# Patient Record
Sex: FEMALE | Race: WHITE | NOT HISPANIC OR LATINO | Employment: FULL TIME | URBAN - METROPOLITAN AREA
[De-identification: names, ages, dates, MRNs, and addresses within clinical notes are randomized per-mention and may not be internally consistent; named-entity substitution may affect disease eponyms.]

---

## 2021-09-09 ENCOUNTER — APPOINTMENT (OUTPATIENT)
Dept: URGENT CARE | Facility: CLINIC | Age: 36
End: 2021-09-09
Payer: COMMERCIAL

## 2022-06-15 ENCOUNTER — HOSPITAL ENCOUNTER (OUTPATIENT)
Facility: MEDICAL CENTER | Age: 37
End: 2022-06-15
Attending: NURSE PRACTITIONER
Payer: COMMERCIAL

## 2022-06-15 ENCOUNTER — OFFICE VISIT (OUTPATIENT)
Dept: URGENT CARE | Facility: CLINIC | Age: 37
End: 2022-06-15
Payer: COMMERCIAL

## 2022-06-15 VITALS
HEIGHT: 66 IN | WEIGHT: 293 LBS | DIASTOLIC BLOOD PRESSURE: 84 MMHG | OXYGEN SATURATION: 96 % | HEART RATE: 114 BPM | BODY MASS INDEX: 47.09 KG/M2 | TEMPERATURE: 96.8 F | RESPIRATION RATE: 18 BRPM | SYSTOLIC BLOOD PRESSURE: 120 MMHG

## 2022-06-15 DIAGNOSIS — R10.84 GENERALIZED ABDOMINAL PAIN: ICD-10-CM

## 2022-06-15 DIAGNOSIS — K21.9 GASTROESOPHAGEAL REFLUX DISEASE WITHOUT ESOPHAGITIS: ICD-10-CM

## 2022-06-15 PROCEDURE — 99204 OFFICE O/P NEW MOD 45 MIN: CPT | Performed by: NURSE PRACTITIONER

## 2022-06-15 PROCEDURE — 87086 URINE CULTURE/COLONY COUNT: CPT

## 2022-06-15 RX ORDER — FAMOTIDINE 20 MG/1
20 TABLET, FILM COATED ORAL 2 TIMES DAILY
Qty: 60 TABLET | Refills: 0 | Status: SHIPPED | OUTPATIENT
Start: 2022-06-15

## 2022-06-15 ASSESSMENT — ENCOUNTER SYMPTOMS
FEVER: 0
HEMATOCHEZIA: 0
SORE THROAT: 0
BLOOD IN STOOL: 0
ABDOMINAL PAIN: 1
VOMITING: 0
HEADACHES: 0
DIZZINESS: 0
CONSTIPATION: 0
ANOREXIA: 0
MYALGIAS: 0
NAUSEA: 0
DIARRHEA: 0
CHILLS: 0
SHORTNESS OF BREATH: 0
HEARTBURN: 1
EYE PAIN: 0

## 2022-06-15 ASSESSMENT — CROHNS DISEASE ACTIVITY INDEX (CDAI): CDAI SCORE: 0

## 2022-06-15 NOTE — PROGRESS NOTES
Subjective:   Dea Moyer is a 37 y.o. female who presents for Abdominal Pain (Upper right under breast, bloating, pain with standing)      Abdominal Pain  This is a new problem. The current episode started more than 1 month ago (Upper GI discomfort with bloating pain radiates occasionally to the left upper quadrant). The onset quality is undetermined. The problem occurs constantly. The problem has been unchanged. The pain is located in the generalized abdominal region, epigastric region and LUQ. The pain is at a severity of 7/10. The pain is moderate. The quality of the pain is sharp. The abdominal pain radiates to the LUQ. Pertinent negatives include no anorexia, constipation, diarrhea, fever, headaches, hematochezia, hematuria, melena, myalgias, nausea or vomiting. The pain is aggravated by eating and movement. The pain is relieved by nothing. She has tried antacids and proton pump inhibitors for the symptoms. The treatment provided no relief. Her past medical history is significant for GERD. There is no history of abdominal surgery, Crohn's disease, gallstones, irritable bowel syndrome or pancreatitis.       Review of Systems   Constitutional: Negative for chills and fever.   HENT: Negative for sore throat.    Eyes: Negative for pain.   Respiratory: Negative for shortness of breath.    Cardiovascular: Negative for chest pain.   Gastrointestinal: Positive for abdominal pain and heartburn. Negative for anorexia, blood in stool, constipation, diarrhea, hematochezia, melena, nausea and vomiting.   Genitourinary: Negative for hematuria.   Musculoskeletal: Negative for myalgias.   Skin: Negative for rash.   Neurological: Negative for dizziness and headaches.       Medications:    • famotidine Tabs    Allergies: Patient has no known allergies.    Problem List: Dea Moyer does not have a problem list on file.    Surgical History:  No past surgical history on file.    Past Social Hx: Dea Moyer   "reports that she has never smoked. She has never used smokeless tobacco.     Past Family Hx:  Dea Moyer family history is not on file.     Problem list, medications, and allergies reviewed by myself today in Epic.     Objective:     /84   Pulse (!) 114   Temp 36 °C (96.8 °F) (Temporal)   Resp 18   Ht 1.676 m (5' 6\")   Wt (!) 137 kg (301 lb)   LMP 06/05/2022 (Within Days)   SpO2 96%   BMI 48.58 kg/m²     Physical Exam  Vitals and nursing note reviewed.   Constitutional:       General: She is not in acute distress.     Appearance: She is well-developed. She is morbidly obese.   HENT:      Head: Normocephalic and atraumatic.      Right Ear: External ear normal.      Left Ear: External ear normal.      Nose: Nose normal.      Mouth/Throat:      Mouth: Mucous membranes are moist.   Eyes:      Conjunctiva/sclera: Conjunctivae normal.   Cardiovascular:      Rate and Rhythm: Normal rate.   Pulmonary:      Effort: Pulmonary effort is normal. No respiratory distress.      Breath sounds: Normal breath sounds.   Abdominal:      General: Bowel sounds are normal. There is no distension.      Tenderness: There is generalized abdominal tenderness. There is no right CVA tenderness, left CVA tenderness, guarding or rebound. Negative signs include Hunter's sign, Rovsing's sign, McBurney's sign, psoas sign and obturator sign.      Hernia: No hernia is present.   Musculoskeletal:         General: Normal range of motion.   Skin:     General: Skin is warm and dry.   Neurological:      General: No focal deficit present.      Mental Status: She is alert and oriented to person, place, and time. Mental status is at baseline.      Gait: Gait (gait at baseline) normal.   Psychiatric:         Judgment: Judgment normal.         Assessment/Plan:     Diagnosis and associated orders:     1. Generalized abdominal pain  famotidine (PEPCID) 20 MG Tab    Referral to Gastroenterology    URINE CULTURE(NEW)   2. Gastroesophageal " reflux disease without esophagitis  famotidine (PEPCID) 20 MG Tab    Referral to Gastroenterology        Comments/MDM:     I personally reviewed prior external notes and prior test results pertinent to today's visit.  Patient having no acute abdominal tenderness, no rebound and no guarding, ongoing symptoms x2 months will trial Rx medication discussed dietary modifications, patient will follow up with GI for further evaluation management.  Discussed management options, risks and benefits, and alternatives to treatment plan agreed upon.   Red flags discussed and indications to immediately call 911 or present to the Emergency Department.   Supportive care, differential diagnoses, and indications for immediate follow-up discussed with patient.    • Patient expresses understanding and agrees to plan. Patient denies any other questions or concerns.   •              Please note that this dictation was created using voice recognition software. I have made a reasonable attempt to correct obvious errors, but I expect that there are errors of grammar and possibly content that I did not discover before finalizing the note.    This note was electronically signed by Abad WEBSTER.

## 2022-06-16 DIAGNOSIS — R10.84 GENERALIZED ABDOMINAL PAIN: ICD-10-CM

## 2022-06-18 LAB
BACTERIA UR CULT: NORMAL
SIGNIFICANT IND 70042: NORMAL
SITE SITE: NORMAL
SOURCE SOURCE: NORMAL

## 2022-06-20 ENCOUNTER — APPOINTMENT (OUTPATIENT)
Dept: RADIOLOGY | Facility: MEDICAL CENTER | Age: 37
DRG: 742 | End: 2022-06-20
Attending: EMERGENCY MEDICINE
Payer: COMMERCIAL

## 2022-06-20 ENCOUNTER — HOSPITAL ENCOUNTER (INPATIENT)
Facility: MEDICAL CENTER | Age: 37
LOS: 7 days | DRG: 742 | End: 2022-06-27
Attending: EMERGENCY MEDICINE | Admitting: SPECIALIST
Payer: COMMERCIAL

## 2022-06-20 DIAGNOSIS — R10.9 ABDOMINAL PAIN, UNSPECIFIED ABDOMINAL LOCATION: ICD-10-CM

## 2022-06-20 DIAGNOSIS — R19.00 PELVIC MASS: ICD-10-CM

## 2022-06-20 DIAGNOSIS — G89.18 POSTOPERATIVE PAIN: ICD-10-CM

## 2022-06-20 LAB
ALBUMIN SERPL BCP-MCNC: 3.8 G/DL (ref 3.2–4.9)
ALBUMIN/GLOB SERPL: 1 G/DL
ALP SERPL-CCNC: 59 U/L (ref 30–99)
ALT SERPL-CCNC: 11 U/L (ref 2–50)
ANION GAP SERPL CALC-SCNC: 13 MMOL/L (ref 7–16)
APPEARANCE UR: CLEAR
AST SERPL-CCNC: 21 U/L (ref 12–45)
BASOPHILS # BLD AUTO: 0.4 % (ref 0–1.8)
BASOPHILS # BLD: 0.03 K/UL (ref 0–0.12)
BILIRUB SERPL-MCNC: 0.4 MG/DL (ref 0.1–1.5)
BILIRUB UR QL STRIP.AUTO: NEGATIVE
BUN SERPL-MCNC: 8 MG/DL (ref 8–22)
CALCIUM SERPL-MCNC: 8.9 MG/DL (ref 8.5–10.5)
CANCER AG125 SERPL-ACNC: 434 U/ML (ref 0–35)
CEA SERPL-MCNC: 94.3 NG/ML (ref 0–3)
CHLORIDE SERPL-SCNC: 102 MMOL/L (ref 96–112)
CO2 SERPL-SCNC: 23 MMOL/L (ref 20–33)
COLOR UR: YELLOW
CREAT SERPL-MCNC: 0.8 MG/DL (ref 0.5–1.4)
EOSINOPHIL # BLD AUTO: 0.12 K/UL (ref 0–0.51)
EOSINOPHIL NFR BLD: 1.8 % (ref 0–6.9)
ERYTHROCYTE [DISTWIDTH] IN BLOOD BY AUTOMATED COUNT: 46.1 FL (ref 35.9–50)
GFR SERPLBLD CREATININE-BSD FMLA CKD-EPI: 98 ML/MIN/1.73 M 2
GLOBULIN SER CALC-MCNC: 3.8 G/DL (ref 1.9–3.5)
GLUCOSE SERPL-MCNC: 80 MG/DL (ref 65–99)
GLUCOSE UR STRIP.AUTO-MCNC: NEGATIVE MG/DL
HCG SERPL QL: NEGATIVE
HCT VFR BLD AUTO: 37.9 % (ref 37–47)
HGB BLD-MCNC: 12.2 G/DL (ref 12–16)
IMM GRANULOCYTES # BLD AUTO: 0.01 K/UL (ref 0–0.11)
IMM GRANULOCYTES NFR BLD AUTO: 0.1 % (ref 0–0.9)
KETONES UR STRIP.AUTO-MCNC: 40 MG/DL
LEUKOCYTE ESTERASE UR QL STRIP.AUTO: NEGATIVE
LIPASE SERPL-CCNC: 14 U/L (ref 11–82)
LYMPHOCYTES # BLD AUTO: 1.46 K/UL (ref 1–4.8)
LYMPHOCYTES NFR BLD: 21.7 % (ref 22–41)
MCH RBC QN AUTO: 27.7 PG (ref 27–33)
MCHC RBC AUTO-ENTMCNC: 32.2 G/DL (ref 33.6–35)
MCV RBC AUTO: 86.1 FL (ref 81.4–97.8)
MICRO URNS: ABNORMAL
MONOCYTES # BLD AUTO: 0.4 K/UL (ref 0–0.85)
MONOCYTES NFR BLD AUTO: 5.9 % (ref 0–13.4)
NEUTROPHILS # BLD AUTO: 4.72 K/UL (ref 2–7.15)
NEUTROPHILS NFR BLD: 70.1 % (ref 44–72)
NITRITE UR QL STRIP.AUTO: NEGATIVE
NRBC # BLD AUTO: 0 K/UL
NRBC BLD-RTO: 0 /100 WBC
PH UR STRIP.AUTO: 5.5 [PH] (ref 5–8)
PLATELET # BLD AUTO: 451 K/UL (ref 164–446)
PMV BLD AUTO: 8.7 FL (ref 9–12.9)
POTASSIUM SERPL-SCNC: 3.9 MMOL/L (ref 3.6–5.5)
PROT SERPL-MCNC: 7.6 G/DL (ref 6–8.2)
PROT UR QL STRIP: NEGATIVE MG/DL
RBC # BLD AUTO: 4.4 M/UL (ref 4.2–5.4)
RBC UR QL AUTO: NEGATIVE
SODIUM SERPL-SCNC: 138 MMOL/L (ref 135–145)
SP GR UR STRIP.AUTO: 1.02
UROBILINOGEN UR STRIP.AUTO-MCNC: 0.2 MG/DL
WBC # BLD AUTO: 6.7 K/UL (ref 4.8–10.8)

## 2022-06-20 PROCEDURE — 76856 US EXAM PELVIC COMPLETE: CPT

## 2022-06-20 PROCEDURE — 700111 HCHG RX REV CODE 636 W/ 250 OVERRIDE (IP): Performed by: EMERGENCY MEDICINE

## 2022-06-20 PROCEDURE — 700105 HCHG RX REV CODE 258: Performed by: SPECIALIST

## 2022-06-20 PROCEDURE — 36415 COLL VENOUS BLD VENIPUNCTURE: CPT

## 2022-06-20 PROCEDURE — 86304 IMMUNOASSAY TUMOR CA 125: CPT

## 2022-06-20 PROCEDURE — 99285 EMERGENCY DEPT VISIT HI MDM: CPT

## 2022-06-20 PROCEDURE — 84703 CHORIONIC GONADOTROPIN ASSAY: CPT

## 2022-06-20 PROCEDURE — A9270 NON-COVERED ITEM OR SERVICE: HCPCS | Performed by: SPECIALIST

## 2022-06-20 PROCEDURE — 96374 THER/PROPH/DIAG INJ IV PUSH: CPT

## 2022-06-20 PROCEDURE — 96376 TX/PRO/DX INJ SAME DRUG ADON: CPT

## 2022-06-20 PROCEDURE — 74176 CT ABD & PELVIS W/O CONTRAST: CPT

## 2022-06-20 PROCEDURE — 85025 COMPLETE CBC W/AUTO DIFF WBC: CPT

## 2022-06-20 PROCEDURE — 700102 HCHG RX REV CODE 250 W/ 637 OVERRIDE(OP): Performed by: SPECIALIST

## 2022-06-20 PROCEDURE — 83690 ASSAY OF LIPASE: CPT

## 2022-06-20 PROCEDURE — 770006 HCHG ROOM/CARE - MED/SURG/GYN SEMI*

## 2022-06-20 PROCEDURE — 96375 TX/PRO/DX INJ NEW DRUG ADDON: CPT

## 2022-06-20 PROCEDURE — 80053 COMPREHEN METABOLIC PANEL: CPT

## 2022-06-20 PROCEDURE — 82378 CARCINOEMBRYONIC ANTIGEN: CPT

## 2022-06-20 PROCEDURE — 81003 URINALYSIS AUTO W/O SCOPE: CPT

## 2022-06-20 RX ORDER — KETOROLAC TROMETHAMINE 30 MG/ML
15 INJECTION, SOLUTION INTRAMUSCULAR; INTRAVENOUS ONCE
Status: COMPLETED | OUTPATIENT
Start: 2022-06-20 | End: 2022-06-20

## 2022-06-20 RX ORDER — ONDANSETRON 2 MG/ML
4 INJECTION INTRAMUSCULAR; INTRAVENOUS EVERY 4 HOURS PRN
Status: DISCONTINUED | OUTPATIENT
Start: 2022-06-20 | End: 2022-06-23

## 2022-06-20 RX ORDER — POLYETHYLENE GLYCOL 3350 17 G/17G
1 POWDER, FOR SOLUTION ORAL
Status: DISCONTINUED | OUTPATIENT
Start: 2022-06-20 | End: 2022-06-22

## 2022-06-20 RX ORDER — IBUPROFEN 200 MG
600 TABLET ORAL EVERY 6 HOURS PRN
COMMUNITY

## 2022-06-20 RX ORDER — BISACODYL 10 MG
10 SUPPOSITORY, RECTAL RECTAL
Status: DISCONTINUED | OUTPATIENT
Start: 2022-06-20 | End: 2022-06-22

## 2022-06-20 RX ORDER — ONDANSETRON 2 MG/ML
4 INJECTION INTRAMUSCULAR; INTRAVENOUS ONCE
Status: COMPLETED | OUTPATIENT
Start: 2022-06-20 | End: 2022-06-20

## 2022-06-20 RX ORDER — HYDROCODONE BITARTRATE AND ACETAMINOPHEN 5; 325 MG/1; MG/1
1 TABLET ORAL
Status: DISCONTINUED | OUTPATIENT
Start: 2022-06-20 | End: 2022-06-21

## 2022-06-20 RX ORDER — HYDROCODONE BITARTRATE AND ACETAMINOPHEN 5; 325 MG/1; MG/1
1 TABLET ORAL EVERY 6 HOURS PRN
Qty: 12 TABLET | Refills: 0 | Status: SHIPPED | OUTPATIENT
Start: 2022-06-20 | End: 2022-06-23

## 2022-06-20 RX ORDER — IBUPROFEN 600 MG/1
600 TABLET ORAL 3 TIMES DAILY PRN
Status: COMPLETED | OUTPATIENT
Start: 2022-06-20 | End: 2022-06-21

## 2022-06-20 RX ORDER — MORPHINE SULFATE 4 MG/ML
4 INJECTION INTRAVENOUS ONCE
Status: COMPLETED | OUTPATIENT
Start: 2022-06-20 | End: 2022-06-20

## 2022-06-20 RX ORDER — SODIUM CHLORIDE, SODIUM LACTATE, POTASSIUM CHLORIDE, CALCIUM CHLORIDE 600; 310; 30; 20 MG/100ML; MG/100ML; MG/100ML; MG/100ML
INJECTION, SOLUTION INTRAVENOUS CONTINUOUS
Status: DISCONTINUED | OUTPATIENT
Start: 2022-06-20 | End: 2022-06-27 | Stop reason: HOSPADM

## 2022-06-20 RX ORDER — AMOXICILLIN 250 MG
2 CAPSULE ORAL 2 TIMES DAILY
Status: DISCONTINUED | OUTPATIENT
Start: 2022-06-20 | End: 2022-06-22

## 2022-06-20 RX ORDER — MORPHINE SULFATE 4 MG/ML
4 INJECTION INTRAVENOUS EVERY 4 HOURS PRN
Status: COMPLETED | OUTPATIENT
Start: 2022-06-20 | End: 2022-06-21

## 2022-06-20 RX ORDER — ONDANSETRON 2 MG/ML
4 INJECTION INTRAMUSCULAR; INTRAVENOUS EVERY 6 HOURS PRN
Status: DISCONTINUED | OUTPATIENT
Start: 2022-06-20 | End: 2022-06-20

## 2022-06-20 RX ORDER — OXYCODONE HYDROCHLORIDE AND ACETAMINOPHEN 5; 325 MG/1; MG/1
1 TABLET ORAL EVERY 4 HOURS PRN
Status: DISCONTINUED | OUTPATIENT
Start: 2022-06-20 | End: 2022-06-23

## 2022-06-20 RX ADMIN — MORPHINE SULFATE 4 MG: 4 INJECTION INTRAVENOUS at 21:16

## 2022-06-20 RX ADMIN — SODIUM CHLORIDE, POTASSIUM CHLORIDE, SODIUM LACTATE AND CALCIUM CHLORIDE: 600; 310; 30; 20 INJECTION, SOLUTION INTRAVENOUS at 21:16

## 2022-06-20 RX ADMIN — MORPHINE SULFATE 4 MG: 4 INJECTION INTRAVENOUS at 19:06

## 2022-06-20 RX ADMIN — ONDANSETRON HYDROCHLORIDE 4 MG: 2 SOLUTION INTRAMUSCULAR; INTRAVENOUS at 19:07

## 2022-06-20 RX ADMIN — KETOROLAC TROMETHAMINE 15 MG: 30 INJECTION, SOLUTION INTRAMUSCULAR; INTRAVENOUS at 14:17

## 2022-06-20 RX ADMIN — OXYCODONE HYDROCHLORIDE AND ACETAMINOPHEN 1 TABLET: 5; 325 TABLET ORAL at 23:33

## 2022-06-20 ASSESSMENT — LIFESTYLE VARIABLES: DO YOU DRINK ALCOHOL: NO

## 2022-06-20 NOTE — ED NOTES
Pt informed that urine specimen needs to be collected, instructed to call staff once ready to use the restroom.

## 2022-06-20 NOTE — DISCHARGE INSTRUCTIONS
Discharge Instructions    Discharged to home by car with relative. Discharged via wheelchair, hospital escort: Yes.  Special equipment needed: Not Applicable    Be sure to schedule a follow-up appointment with your primary care doctor or any specialists as instructed.     Discharge Plan:        I understand that a diet low in cholesterol, fat, and sodium is recommended for good health. Unless I have been given specific instructions below for another diet, I accept this instruction as my diet prescription.   Other diet:     Special Instructions: None    Is patient discharged on Warfarin / Coumadin?   No     Depression / Suicide Risk    As you are discharged from this [facility] facility, it is important to learn how to keep safe from harming yourself.    Recognize the warning signs:  Abrupt changes in personality, positive or negative- including increase in energy   Giving away possessions  Change in eating patterns- significant weight changes-  positive or negative  Change in sleeping patterns- unable to sleep or sleeping all the time   Unwillingness or inability to communicate  Depression  Unusual sadness, discouragement and loneliness  Talk of wanting to die  Neglect of personal appearance   Rebelliousness- reckless behavior  Withdrawal from people/activities they love  Confusion- inability to concentrate     If you or a loved one observes any of these behaviors or has concerns about self-harm, here's what you can do:  Talk about it- your feelings and reasons for harming yourself  Remove any means that you might use to hurt yourself (examples: pills, rope, extension cords, firearm)  Get professional help from the community (Mental Health, Substance Abuse, psychological counseling)  Do not be alone:Call your Safe Contact- someone whom you trust who will be there for you.  Call your local CRISIS HOTLINE 403-0342 or 408-296-3387  Call your local Children's Mobile Crisis Response Team [place] (727) 253-0372  or www.[employer]  Call the toll free National Suicide Prevention Hotlines   National Suicide Prevention Lifeline 338-893-VCSP (2759)  [facility] Line Network 800-SUICIDE (501-6724)      1. No heavy lifting greater than 10 pounds for minimum 6 weeks  2. Nothing in vagina (ie no tampons, douching, intercourse) for minimum of 6 weeks  3. No driving while taking narcotics   4. Return to our office as directed and call to confirm appointment  Call our office [phone] if you develop any fevers, chills, nausea/vomiting, heavy vaginal bleeding, or redness, tenderness, and/or drainage from your wound, if you have persistent watery discharge while ambulating or stool draining from the vagina .  5. Showering is ok after shower make sure wound is dry. Ok to leave midline incision open to air.   6. If you have not had a bowel movement for 2 days, please take over the counter Milk of Magnesium, 1 tablespoon every 4 hours. After 4 doses and if you still have not had a bowel movement, please call your doctor.   7. You may resume your regular diet.

## 2022-06-20 NOTE — ED TRIAGE NOTES
"Chief Complaint   Patient presents with   • Abdominal Pain     Pt reports that for the last 2 months she has had worsening, intermittent, bilateral epigastric pain that is worse when she moves. She has also noticed abdominal swelling that is abnormal for her. Pt reports that she went to urgent care on Wednesday and was told that she might have a hiatal hernia, but no imaging was done. Pt was referred to a GI doctor, and had an appointment today.      BP (!) 168/109   Pulse (!) 112   Temp 36.2 °C (97.1 °F) (Temporal)   Resp 16   Ht 1.676 m (5' 6\")   Wt (!) 135 kg (298 lb 1 oz)   LMP 06/17/2022 (Within Days)   SpO2 97%   BMI 48.11 kg/m²     Pt is ambulatory in and out of triage. Appropriate PPE worn throughout entire encounter. Pt placed back in the lobby and educated about triage process.    "

## 2022-06-20 NOTE — ED PROVIDER NOTES
ED Provider Note    ER PROVIDER NOTE      CHIEF COMPLAINT  Chief Complaint   Patient presents with   • Abdominal Pain     Pt reports that for the last 2 months she has had worsening, intermittent, bilateral epigastric pain that is worse when she moves. She has also noticed abdominal swelling that is abnormal for her. Pt reports that she went to urgent care on Wednesday and was told that she might have a hiatal hernia, but no imaging was done. Pt was referred to a GI doctor, and had an appointment today.        HPI  Dea Moyer is a 37 y.o. female who presents to the emergency department complaining of abdominal pain.  Patient reports she has had gradually worsening pain over the last 2 months, seems to be more upper, and left-sided although somewhat central she feels her abdomen is somewhat distended as well.  The pain is more of a pressure and swelling sensation.  Does not seem to have any alleviating or aggravating factors, may be better when she is lying down.  No real changes with eating.  She reports no nausea or vomiting.  No diarrhea.  No fevers or chills, no chest pain, cough, congestion or shortness of breath.  No dysuria.  States she is not pregnant.    REVIEW OF SYSTEMS  Pertinent positives include abdominal pain. Pertinent negatives include no fever. See HPI for details. All other systems reviewed and are negative.    PAST MEDICAL HISTORY       SURGICAL HISTORY  patient denies any surgical history    FAMILY HISTORY  History reviewed. No pertinent family history.    SOCIAL HISTORY  Social History     Socioeconomic History   • Marital status: Single   Tobacco Use   • Smoking status: Never Smoker   • Smokeless tobacco: Never Used   Vaping Use   • Vaping Use: Never used   Substance and Sexual Activity   • Alcohol use: Yes     Comment: occasionally   • Drug use: Not Currently      Social History     Substance and Sexual Activity   Drug Use Not Currently       CURRENT MEDICATIONS  Home Medications      "Reviewed by Cindy Houston PhT (Pharmacy Tech) on 06/20/22 at 1452  Med List Status: Complete   Medication Last Dose Status   famotidine (PEPCID) 20 MG Tab NOT YET STARTED Active   ibuprofen (MOTRIN) 200 MG Tab 6/20/2022 Active                ALLERGIES  No Known Allergies    PHYSICAL EXAM  VITAL SIGNS: BP (!) 156/95   Pulse (!) 103   Temp 36.2 °C (97.1 °F) (Temporal)   Resp 16   Ht 1.676 m (5' 6\")   Wt (!) 135 kg (298 lb 1 oz)   LMP 06/17/2022 (Within Days)   SpO2 98%   BMI 48.11 kg/m²   Pulse ox interpretation: I interpret this pulse ox as normal.    Constitutional: Alert in no apparent distress.  HENT: No signs of trauma, Bilateral external ears normal, Nose normal.   Eyes: Pupils are equal and reactive, Conjunctiva normal, Non-icteric.   Neck: Normal range of motion, No tenderness, Supple, No stridor.   Lymphatic: No lymphadenopathy noted.   Cardiovascular: Regular rate and rhythm, no murmurs.   Thorax & Lungs: Normal breath sounds, No respiratory distress, No wheezing, No chest tenderness.   Abdomen: Bowel sounds normal, moderate diffuse tenderness,  No pulsatile masses. No peritoneal signs.  Skin: Warm, Dry, No erythema, No rash.   Back: No bony tenderness, No CVA tenderness.   Extremities: Intact distal pulses, No edema, No tenderness, No cyanosis,   Musculoskeletal: Good range of motion in all major joints. No tenderness to palpation or major deformities noted.   Neurologic: Alert , Normal motor function, Normal sensory function, No focal deficits noted.   Psychiatric: Affect normal, Judgment normal, Mood normal.     DIAGNOSTIC STUDIES / PROCEDURES        LABS  Labs Reviewed   URINALYSIS - Abnormal; Notable for the following components:       Result Value    Ketones 40 (*)     All other components within normal limits   COMP METABOLIC PANEL - Abnormal; Notable for the following components:    Globulin 3.8 (*)     All other components within normal limits   CBC WITH DIFFERENTIAL - Abnormal; Notable for " the following components:    MCHC 32.2 (*)     Platelet Count 451 (*)     MPV 8.7 (*)     Lymphocytes 21.70 (*)     All other components within normal limits    Narrative:     SPECIMEN IS A RECOLLECT   CEA - Abnormal; Notable for the following components:    Carcinoembryonic Antigen 94.3 (*)     All other components within normal limits   CANCER ANTIGEN 125 - Abnormal; Notable for the following components:    Ca 125 434.0 (*)     All other components within normal limits   LIPASE   HCG QUAL SERUM   ESTIMATED GFR       All labs reviewed by me.    RADIOLOGY  US-PELVIC COMPLETE (TRANSABDOMINAL/TRANSVAGINAL) (COMBO)   Final Result      1.  Large, heterogenous, likely right adnexal mass measuring approximately 15.6 x 22.3 x 15.6 cm.   2.  The right adnexal teratoma seen on CT is not definitively seen on this exam.   3.  Moderate pelvic free fluid.   4.  Bilateral ovaries not visualized.   5.  Uterus is poorly visualized due to patient body habitus and large pelvic mass.      CT-RENAL COLIC EVALUATION(A/P W/O)   Final Result   Addendum 1 of 1   Small volume pelvic free fluid.      Final      1.  Large peritoneal mass measuring 31.8 x 21.5 cm that appears to arise from the pelvis. There is internal fat density. Appearance favors a large teratoma. Liposarcoma is in the differential.   2.  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   3.  Small perihepatic ascites.        The radiologist's interpretation of all radiological studies have been reviewed and images independently viewed by me.    COURSE & MEDICAL DECISION MAKING  Nursing notes, VS, PMSFHx reviewed in chart.    9:16 AM Patient seen and examined at bedside. Patient will be treated with toradol. Ordered for CBC, CMP, lipase, CT to evaluate her symptoms.     11:40 AM  Patient is reevaluated and updated on results of CT, plan for ultrasound    Patient's ultrasound has returned.  I discussed the findings with her.    Case is discussed with Dr. Gaspar, from gyn./onc    After  he is reviewed the case, he will plan to admit the patient for surgery on Thursday      Decision Making:  This is a 37 y.o. female presenting with progressive abdominal pain and some distention over the last 2 months.  Her initial CT showed large mass which seem to be pelvic in origin so ultrasound was obtained for further characterization.  She does not have a large pelvic mass and as such, Dr. Gaspar was consulted as above.  He will admit the patient for further care and surgical intervention    Patient is admitted in stable condition    FINAL IMPRESSION  1. Abdominal pain, unspecified abdominal location    2. Pelvic mass         The note accurately reflects work and decisions made by me.  Glenn Linares M.D.  6/20/2022  3:37 PM

## 2022-06-21 LAB — ABO + RH BLD: NORMAL

## 2022-06-21 PROCEDURE — 96376 TX/PRO/DX INJ SAME DRUG ADON: CPT

## 2022-06-21 PROCEDURE — A9270 NON-COVERED ITEM OR SERVICE: HCPCS | Performed by: SPECIALIST

## 2022-06-21 PROCEDURE — 770001 HCHG ROOM/CARE - MED/SURG/GYN PRIV*

## 2022-06-21 PROCEDURE — 700102 HCHG RX REV CODE 250 W/ 637 OVERRIDE(OP): Performed by: SPECIALIST

## 2022-06-21 PROCEDURE — 700102 HCHG RX REV CODE 250 W/ 637 OVERRIDE(OP): Performed by: EMERGENCY MEDICINE

## 2022-06-21 PROCEDURE — 700111 HCHG RX REV CODE 636 W/ 250 OVERRIDE (IP): Performed by: NURSE PRACTITIONER

## 2022-06-21 PROCEDURE — A9270 NON-COVERED ITEM OR SERVICE: HCPCS | Performed by: EMERGENCY MEDICINE

## 2022-06-21 PROCEDURE — 700105 HCHG RX REV CODE 258: Performed by: SPECIALIST

## 2022-06-21 PROCEDURE — 700111 HCHG RX REV CODE 636 W/ 250 OVERRIDE (IP): Performed by: EMERGENCY MEDICINE

## 2022-06-21 RX ORDER — MORPHINE SULFATE 4 MG/ML
4 INJECTION INTRAVENOUS EVERY 4 HOURS PRN
Status: DISCONTINUED | OUTPATIENT
Start: 2022-06-21 | End: 2022-06-22

## 2022-06-21 RX ADMIN — MORPHINE SULFATE 4 MG: 4 INJECTION INTRAVENOUS at 01:36

## 2022-06-21 RX ADMIN — MORPHINE SULFATE 4 MG: 4 INJECTION INTRAVENOUS at 23:08

## 2022-06-21 RX ADMIN — MORPHINE SULFATE 4 MG: 4 INJECTION INTRAVENOUS at 18:09

## 2022-06-21 RX ADMIN — OXYCODONE HYDROCHLORIDE AND ACETAMINOPHEN 1 TABLET: 5; 325 TABLET ORAL at 20:56

## 2022-06-21 RX ADMIN — MORPHINE SULFATE 4 MG: 4 INJECTION INTRAVENOUS at 12:14

## 2022-06-21 RX ADMIN — SENNOSIDES AND DOCUSATE SODIUM 2 TABLET: 50; 8.6 TABLET ORAL at 16:18

## 2022-06-21 RX ADMIN — HYDROCODONE BITARTRATE AND ACETAMINOPHEN 1 TABLET: 5; 325 TABLET ORAL at 11:07

## 2022-06-21 RX ADMIN — OXYCODONE HYDROCHLORIDE AND ACETAMINOPHEN 1 TABLET: 5; 325 TABLET ORAL at 09:46

## 2022-06-21 RX ADMIN — IBUPROFEN 600 MG: 600 TABLET, FILM COATED ORAL at 11:08

## 2022-06-21 RX ADMIN — SODIUM CHLORIDE, POTASSIUM CHLORIDE, SODIUM LACTATE AND CALCIUM CHLORIDE: 600; 310; 30; 20 INJECTION, SOLUTION INTRAVENOUS at 11:00

## 2022-06-21 RX ADMIN — MORPHINE SULFATE 4 MG: 4 INJECTION INTRAVENOUS at 07:35

## 2022-06-21 RX ADMIN — OXYCODONE HYDROCHLORIDE AND ACETAMINOPHEN 1 TABLET: 5; 325 TABLET ORAL at 15:10

## 2022-06-21 ASSESSMENT — COGNITIVE AND FUNCTIONAL STATUS - GENERAL
DAILY ACTIVITIY SCORE: 24
MOBILITY SCORE: 24
SUGGESTED CMS G CODE MODIFIER DAILY ACTIVITY: CH
SUGGESTED CMS G CODE MODIFIER MOBILITY: CH

## 2022-06-21 ASSESSMENT — LIFESTYLE VARIABLES
HOW MANY TIMES IN THE PAST YEAR HAVE YOU HAD 5 OR MORE DRINKS IN A DAY: 0
AVERAGE NUMBER OF DAYS PER WEEK YOU HAVE A DRINK CONTAINING ALCOHOL: 2
TOTAL SCORE: 0
HAVE YOU EVER FELT YOU SHOULD CUT DOWN ON YOUR DRINKING: NO
HAVE PEOPLE ANNOYED YOU BY CRITICIZING YOUR DRINKING: NO
TOTAL SCORE: 0
ON A TYPICAL DAY WHEN YOU DRINK ALCOHOL HOW MANY DRINKS DO YOU HAVE: 2
ALCOHOL_USE: YES
DOES PATIENT WANT TO STOP DRINKING: NO
EVER HAD A DRINK FIRST THING IN THE MORNING TO STEADY YOUR NERVES TO GET RID OF A HANGOVER: NO
EVER FELT BAD OR GUILTY ABOUT YOUR DRINKING: NO
CONSUMPTION TOTAL: NEGATIVE
TOTAL SCORE: 0

## 2022-06-21 ASSESSMENT — PAIN DESCRIPTION - PAIN TYPE
TYPE: ACUTE PAIN

## 2022-06-21 ASSESSMENT — ENCOUNTER SYMPTOMS
NAUSEA: 0
DIZZINESS: 0
COUGH: 0
SHORTNESS OF BREATH: 0
FEVER: 0
ABDOMINAL PAIN: 1
VOMITING: 0
CHILLS: 0

## 2022-06-21 ASSESSMENT — PATIENT HEALTH QUESTIONNAIRE - PHQ9
2. FEELING DOWN, DEPRESSED, IRRITABLE, OR HOPELESS: NOT AT ALL
1. LITTLE INTEREST OR PLEASURE IN DOING THINGS: NOT AT ALL
SUM OF ALL RESPONSES TO PHQ9 QUESTIONS 1 AND 2: 0

## 2022-06-21 NOTE — DIETARY
NUTRITION SERVICES: BMI - Pt with BMI >40 (=Body mass index is 48.11 kg/m².), Class III obesity. Weight loss counseling not appropriate in acute care setting.     RECOMMEND - If appropriate at DC please refer to outpatient nutrition services for weight management.

## 2022-06-21 NOTE — CARE PLAN
Problem: Pain - Standard  Goal: Alleviation of pain or a reduction in pain to the patient’s comfort goal  Outcome: Progressing  Note: Pt states pain is managed on current pain regimen of percocet and morphine.      Problem: Communication  Goal: The ability to communicate needs accurately and effectively will improve  Outcome: Progressing  Flowsheets (Taken 6/21/2022 9645)  Communication:   Oriented family/support system to call light   Provided augmentative/alternative communication device   Assessed patient's ability to understand and communicate   Oriented patient to call light   Reoriented patient to environment  Note: Plan of care discussed with patient. All questions answered. Plan for today pain control, orient to floor, obtain stool specimen.      The patient is Stable - Low risk of patient condition declining or worsening    Shift Goals  Clinical Goals: pain management; poc  Patient Goals: pain control;sleep    Progress made toward(s) clinical / shift goals:       Patient is not progressing towards the following goals:

## 2022-06-21 NOTE — PROGRESS NOTES
GYN/Oncology Progress Note               Author: HERMINIA Molina Date & Time created: 6/21/2022  8:27 AM     Interval History:  Patient doing well, pain well controlled with MS and percocet. Denies any nausea at this time.     Review of Systems:  Review of Systems   Constitutional: Negative for chills and fever.   Respiratory: Negative for cough and shortness of breath.    Cardiovascular: Negative for chest pain and leg swelling.   Gastrointestinal: Positive for abdominal pain (controlled). Negative for nausea and vomiting.   Genitourinary: Negative for dysuria and urgency.   Neurological: Negative for dizziness.       Physical Exam:  Physical Exam  Constitutional:       Appearance: Normal appearance.   Cardiovascular:      Pulses: Normal pulses.   Pulmonary:      Effort: Pulmonary effort is normal.   Abdominal:      General: There is distension.      Palpations: There is mass.      Tenderness: There is abdominal tenderness.   Skin:     General: Skin is warm and dry.      Capillary Refill: Capillary refill takes 2 to 3 seconds.   Neurological:      Mental Status: She is alert and oriented to person, place, and time.         Labs:          Recent Labs     06/20/22  0940   SODIUM 138   POTASSIUM 3.9   CHLORIDE 102   CO2 23   BUN 8   CREATININE 0.80   CALCIUM 8.9     Recent Labs     06/20/22  0940   ALTSGPT 11   ASTSGOT 21   ALKPHOSPHAT 59   TBILIRUBIN 0.4   LIPASE 14   GLUCOSE 80     Recent Labs     06/20/22  1147   RBC 4.40   HEMOGLOBIN 12.2   HEMATOCRIT 37.9   PLATELETCT 451*     Recent Labs     06/20/22  0940 06/20/22  1147   WBC  --  6.7   NEUTSPOLYS  --  70.10   LYMPHOCYTES  --  21.70*   MONOCYTES  --  5.90   EOSINOPHILS  --  1.80   BASOPHILS  --  0.40   ASTSGOT 21  --    ALTSGPT 11  --    ALKPHOSPHAT 59  --    TBILIRUBIN 0.4  --      Recent Labs     06/20/22  0940   SODIUM 138   POTASSIUM 3.9   CHLORIDE 102   CO2 23   GLUCOSE 80   BUN 8   CREATININE 0.80   CALCIUM 8.9     Hemodynamics:  Temp (24hrs),  Av.5 °C (97.7 °F), Min:36.2 °C (97.1 °F), Max:37.1 °C (98.8 °F)  Temperature: 37.1 °C (98.8 °F)  Pulse  Av.6  Min: 81  Max: 112   Blood Pressure: 136/82     Respiratory:    Respiration: 16, Pulse Oximetry: 92 %           Fluids:  No intake or output data in the 24 hours ending 2227  Weight: (!) 135 kg (298 lb 1 oz)  GI/Nutrition:  Orders Placed This Encounter   Procedures   • Diet Order Diet: Regular; Tray Modifications (optional): No Sharps/Paperware     Standing Status:   Standing     Number of Occurrences:   1     Order Specific Question:   Diet:     Answer:   Regular [1]     Order Specific Question:   Tray Modifications (optional)     Answer:   No Sharps/Paperware     Medical Decision Making, by Problem:  Active Hospital Problems    Diagnosis    • *Pelvic mass [R19.00]    • Abdominal pain [R10.9]        Plan:  37-year-old female presents with 30 cm mass with elevated  of 134 with a CEA of 94.3.      1. Pelvic mass: This is quite concerning for possible mucinous cystadenocarcinoma although a GI primary cannot be ruled out.  Should be noted however that patient has no GI symptoms to suspect that this may be a Krukenberg tumor.  We will go ahead and obtain a guaiac of the stool if it is positive patient should undergo a colonoscopy. If negative plan to proceed with surgery on Thursday with Dr. Gaspar.   2. Abdominal pain: morphine and percocet PRN    Case discussed with Dr. Gaspar.     Quality-Core Measures

## 2022-06-21 NOTE — PROGRESS NOTES
• Received report from ed RN. Assumed care at 1045 marquez bed.    • Patient a & o x 4.   • Pt denies numbness/tingling  • Strength RUE 5/5; LUE 5/5; RLE 5/5, LLE 5/5  • Voiding w/o difficulty  • BM on 6/20. Abd soft, Bowel sounds active, denies nausea, abd tender  • Diet regular  • Pt states pain 5/10   • no skin   • Patient ambulated 50 ft with sb assistance. Gait stedy.   • Patient oriented to room, surroundings and call bell. Bed alarm off. Bed in lowest position, locked and upper side rails up. Patient demonstrated correct use of call bell. Call bell/belongings within reach. Patient educated on hourly rounding.   • Plan   o Obtain stool specimen; if occult +; notify Dr Gaspar; pt will need colonoscopy prior to surgery.   o Pain control  o Ambulation  o Tentative surgery this Thursday.

## 2022-06-21 NOTE — PROGRESS NOTES
4 Eyes Skin Assessment Completed by RAMÍREZ Yanez and RAMÍREZ King.    Head WDL  Ears WDL  Nose WDL  Mouth WDL  Neck WDL  Breast/Chest WDL  Shoulder Blades WDL  Spine WDL  (R) Arm/Elbow/Hand Bruising  (L) Arm/Elbow/Hand Bruising  Abdomen WDL  Groin WDL  Scrotum/Coccyx/Buttocks WDL  (R) Leg WDL  (L) Leg WDL  (R) Heel/Foot/Toe WDL  (L) Heel/Foot/Toe WDL          Devices In Places Pulse Ox      Interventions In Place Gray Ear Foams, NC W/Ear Foams, Pillows, Heels Loaded W/Pillows and Pressure Redistribution Mattress    Possible Skin Injury No    Pictures Uploaded Into Epic N/A  Wound Consult Placed N/A  RN Wound Prevention Protocol Ordered No

## 2022-06-21 NOTE — PROGRESS NOTES
Bedside report received and patient care assumed. Pt is resting in bed, A&Ox4, with  complaints of abd pain 7/10, and is on RA.  All fall precautions are in place, belongings at bedside table.  Pt was updated on POC, no questions or concerns. Pt educated on use of call light for assistance.

## 2022-06-21 NOTE — H&P
"Chief complaint: Abdominal pain with 30 cm abdominal mass  History of present illness: Mrs. Moyer is a G0 white female who has a BMI of 48.  She presented to the emergency room with 2-day history of increased abdominal pain.  Patient underwent evaluation in the emergency room and was noted to have a 30 cm abdominal pelvic mass.   was elevated at 434 and CEA of 94.3.  Because of the complex nature of the mass and elevated  and CEA this was suspicious for possible GYN malignancy.  I was asked to see the patient.    Patient was seen by me for consultation and yellow 63.  Her mother was present.  Her history is as stated above.  Patient denies any change in bowel habits.  She states that she has had normal cycles up until about 2 months ago.  Over the past month she has had heavy bleeding.  She does not desire future fertility.  She denies any blood per rectum.  She denies any stool caliber changes.  She denies any bloatedness.  She has not had a GYN exam for many years.  She otherwise has no other symptoms.    Review of system: 14 point review of system is unremarkable with the exception was been described with HPI.    Allergies: No known drug allergies    Medications: Currently not on any medication    History reviewed. No pertinent past medical history.      History reviewed. No pertinent surgical history.    Social History: She drinks alcohol occasionally but denies any drug use or smoking.    Family history: Significant for lung cancer but no history of ovarian breast uterine or colon cancer.    BP (!) 141/85   Pulse 89   Temp 36.2 °C (97.1 °F) (Temporal)   Resp 16   Ht 1.676 m (5' 6\")   Wt (!) 135 kg (298 lb 1 oz)   LMP 06/17/2022 (Within Days)   SpO2 92%   BMI 48.11 kg/m²       General: Well-developed well-nourished female with a BMI of 48.    GENERAL - Alert and oriented individual, no apparent distress.    HEENT - Within normal limits, no sclera icterus, no pallor of conjunctiva, no " "supraclavicular or cervical lymphadenopathy detected.    LUNGS - Clear to auscultation bilaterally, no rales, rhonci, or wheezes.    CARDIAC - regular rate and rhythm, no murmur, clicks, or gallops.    ABDOMEN -Obese,  soft, non-tender, non-distended,  palpable mobile mass above the umbilicus, no hepatosplenomegaly, no inguinal lymphadenopathy.    PELVIS -deferred    EXTREMITIES - Non-erythematous, non-tender.    NEURO - grossly intact.    CT scan of the abdomen pelvis reviewed no evidence of ascites no evidence of retroperitoneal adenopathy no evidence of rheumatic caking there is some right perihepatic fluid that is noted.  Enlarged complex abdominal pelvic masses noted.    Laboratory test reviewed    Impression: 37-year-old female presents with 30 cm mass with elevated  of 134 with a CEA of 94.3.  This is quite concerning for possible mucinous cystadenocarcinoma although a GI primary cannot be ruled out.  Should be noted however that patient has no GI symptoms to suspect that this may be a Krukenberg tumor.  We will go ahead and obtain a guaiac of the stool if it is positive patient should undergo a colonoscopy.    My recommendation is this mass needs to be excised for surgical pathological evaluation.  I extensively discussed with the patient of my concerns.  I would recommend an exploratory laparotomy with an excision of this pelvic mass and based on the pathology report she may require hysterectomy with removal of both ovaries with possible surgical staging.    Options of treatments were discussed and reviewed. The risks, benefits, and rationale of the procedures were reviewed with the patient. The risks as stated on the [facility] consent forms were reviewed with the patient. The risks including but not limited to infection, bleeding, possible blood transfusion, possible injury to the bowel, bladder, ureter, fistula formations, bladder atony, sexual dysfunction, hernia formation, reoperation, " blood clot, pulmonary embolism, death were all reviewed with the patient, patient is understanding all these risks and wish to proceed with surgery as planned.      Recommend: 1.  Admit to oncology in preparation for surgery which is tentatively scheduled for this coming Thursday on June 23, 2022.  2.  Guaiac of the stool to determine if she has blood in her stool if her guaiac is positive, GI consultation will be requested for colonoscopy to rule out any GI malignancy.  3.  Standard preoperative blood work should be done in preparation for surgery and bowel prep on Wednesday in preparation for surgery Thursday.

## 2022-06-22 LAB
ABO GROUP BLD: NORMAL
ANION GAP SERPL CALC-SCNC: 9 MMOL/L (ref 7–16)
APTT PPP: 34.3 SEC (ref 24.7–36)
BASOPHILS # BLD AUTO: 0.4 % (ref 0–1.8)
BASOPHILS # BLD: 0.02 K/UL (ref 0–0.12)
BLD GP AB SCN SERPL QL: NORMAL
BUN SERPL-MCNC: 12 MG/DL (ref 8–22)
CALCIUM SERPL-MCNC: 8.7 MG/DL (ref 8.5–10.5)
CHLORIDE SERPL-SCNC: 105 MMOL/L (ref 96–112)
CO2 SERPL-SCNC: 24 MMOL/L (ref 20–33)
CREAT SERPL-MCNC: 0.79 MG/DL (ref 0.5–1.4)
EOSINOPHIL # BLD AUTO: 0.23 K/UL (ref 0–0.51)
EOSINOPHIL NFR BLD: 4.3 % (ref 0–6.9)
ERYTHROCYTE [DISTWIDTH] IN BLOOD BY AUTOMATED COUNT: 46.3 FL (ref 35.9–50)
GFR SERPLBLD CREATININE-BSD FMLA CKD-EPI: 100 ML/MIN/1.73 M 2
GLUCOSE SERPL-MCNC: 87 MG/DL (ref 65–99)
HCT VFR BLD AUTO: 35.3 % (ref 37–47)
HEMOCCULT STL QL: NEGATIVE
HGB BLD-MCNC: 11.1 G/DL (ref 12–16)
IMM GRANULOCYTES # BLD AUTO: 0.01 K/UL (ref 0–0.11)
IMM GRANULOCYTES NFR BLD AUTO: 0.2 % (ref 0–0.9)
INR PPP: 1.17 (ref 0.87–1.13)
LYMPHOCYTES # BLD AUTO: 1.63 K/UL (ref 1–4.8)
LYMPHOCYTES NFR BLD: 30.3 % (ref 22–41)
MCH RBC QN AUTO: 27.9 PG (ref 27–33)
MCHC RBC AUTO-ENTMCNC: 31.4 G/DL (ref 33.6–35)
MCV RBC AUTO: 88.7 FL (ref 81.4–97.8)
MONOCYTES # BLD AUTO: 0.49 K/UL (ref 0–0.85)
MONOCYTES NFR BLD AUTO: 9.1 % (ref 0–13.4)
NEUTROPHILS # BLD AUTO: 3 K/UL (ref 2–7.15)
NEUTROPHILS NFR BLD: 55.7 % (ref 44–72)
NRBC # BLD AUTO: 0 K/UL
NRBC BLD-RTO: 0 /100 WBC
PLATELET # BLD AUTO: 420 K/UL (ref 164–446)
PMV BLD AUTO: 8.5 FL (ref 9–12.9)
POTASSIUM SERPL-SCNC: 4 MMOL/L (ref 3.6–5.5)
PROTHROMBIN TIME: 14.5 SEC (ref 12–14.6)
RBC # BLD AUTO: 3.98 M/UL (ref 4.2–5.4)
RH BLD: NORMAL
SODIUM SERPL-SCNC: 138 MMOL/L (ref 135–145)
WBC # BLD AUTO: 5.4 K/UL (ref 4.8–10.8)

## 2022-06-22 PROCEDURE — 36415 COLL VENOUS BLD VENIPUNCTURE: CPT

## 2022-06-22 PROCEDURE — 86900 BLOOD TYPING SEROLOGIC ABO: CPT

## 2022-06-22 PROCEDURE — 85025 COMPLETE CBC W/AUTO DIFF WBC: CPT

## 2022-06-22 PROCEDURE — A9270 NON-COVERED ITEM OR SERVICE: HCPCS | Performed by: SPECIALIST

## 2022-06-22 PROCEDURE — 770001 HCHG ROOM/CARE - MED/SURG/GYN PRIV*

## 2022-06-22 PROCEDURE — 85730 THROMBOPLASTIN TIME PARTIAL: CPT

## 2022-06-22 PROCEDURE — 700111 HCHG RX REV CODE 636 W/ 250 OVERRIDE (IP): Performed by: SPECIALIST

## 2022-06-22 PROCEDURE — 85610 PROTHROMBIN TIME: CPT

## 2022-06-22 PROCEDURE — 700101 HCHG RX REV CODE 250: Performed by: NURSE PRACTITIONER

## 2022-06-22 PROCEDURE — 82272 OCCULT BLD FECES 1-3 TESTS: CPT

## 2022-06-22 PROCEDURE — 700105 HCHG RX REV CODE 258: Performed by: SPECIALIST

## 2022-06-22 PROCEDURE — 86901 BLOOD TYPING SEROLOGIC RH(D): CPT

## 2022-06-22 PROCEDURE — 86850 RBC ANTIBODY SCREEN: CPT

## 2022-06-22 PROCEDURE — 700102 HCHG RX REV CODE 250 W/ 637 OVERRIDE(OP): Performed by: SPECIALIST

## 2022-06-22 PROCEDURE — 700111 HCHG RX REV CODE 636 W/ 250 OVERRIDE (IP): Performed by: NURSE PRACTITIONER

## 2022-06-22 PROCEDURE — 80048 BASIC METABOLIC PNL TOTAL CA: CPT

## 2022-06-22 RX ORDER — HYDROMORPHONE HYDROCHLORIDE 2 MG/ML
2-4 INJECTION, SOLUTION INTRAMUSCULAR; INTRAVENOUS; SUBCUTANEOUS
Status: DISCONTINUED | OUTPATIENT
Start: 2022-06-22 | End: 2022-06-23

## 2022-06-22 RX ADMIN — OXYCODONE HYDROCHLORIDE AND ACETAMINOPHEN 1 TABLET: 5; 325 TABLET ORAL at 01:24

## 2022-06-22 RX ADMIN — OXYCODONE HYDROCHLORIDE AND ACETAMINOPHEN 1 TABLET: 5; 325 TABLET ORAL at 16:03

## 2022-06-22 RX ADMIN — SODIUM CHLORIDE, POTASSIUM CHLORIDE, SODIUM LACTATE AND CALCIUM CHLORIDE: 600; 310; 30; 20 INJECTION, SOLUTION INTRAVENOUS at 00:14

## 2022-06-22 RX ADMIN — POLYETHYLENE GLYCOL 3350, SODIUM SULFATE ANHYDROUS, SODIUM BICARBONATE, SODIUM CHLORIDE, POTASSIUM CHLORIDE 4 L: 236; 22.74; 6.74; 5.86; 2.97 POWDER, FOR SOLUTION ORAL at 13:43

## 2022-06-22 RX ADMIN — SENNOSIDES AND DOCUSATE SODIUM 2 TABLET: 50; 8.6 TABLET ORAL at 06:17

## 2022-06-22 RX ADMIN — ONDANSETRON HYDROCHLORIDE 4 MG: 2 SOLUTION INTRAMUSCULAR; INTRAVENOUS at 23:14

## 2022-06-22 RX ADMIN — MORPHINE SULFATE 4 MG: 4 INJECTION INTRAVENOUS at 03:40

## 2022-06-22 RX ADMIN — MORPHINE SULFATE 4 MG: 4 INJECTION INTRAVENOUS at 13:34

## 2022-06-22 RX ADMIN — OXYCODONE HYDROCHLORIDE AND ACETAMINOPHEN 1 TABLET: 5; 325 TABLET ORAL at 06:18

## 2022-06-22 RX ADMIN — MORPHINE SULFATE 4 MG: 4 INJECTION INTRAVENOUS at 17:26

## 2022-06-22 RX ADMIN — HYDROMORPHONE HYDROCHLORIDE 2 MG: 2 INJECTION INTRAMUSCULAR; INTRAVENOUS; SUBCUTANEOUS at 22:44

## 2022-06-22 RX ADMIN — OXYCODONE HYDROCHLORIDE AND ACETAMINOPHEN 1 TABLET: 5; 325 TABLET ORAL at 21:45

## 2022-06-22 RX ADMIN — HYDROMORPHONE HYDROCHLORIDE 2 MG: 2 INJECTION INTRAMUSCULAR; INTRAVENOUS; SUBCUTANEOUS at 19:18

## 2022-06-22 RX ADMIN — OXYCODONE HYDROCHLORIDE AND ACETAMINOPHEN 1 TABLET: 5; 325 TABLET ORAL at 11:46

## 2022-06-22 RX ADMIN — MORPHINE SULFATE 4 MG: 4 INJECTION INTRAVENOUS at 09:27

## 2022-06-22 ASSESSMENT — PAIN DESCRIPTION - PAIN TYPE
TYPE: ACUTE PAIN

## 2022-06-22 ASSESSMENT — ENCOUNTER SYMPTOMS
VOMITING: 0
DIZZINESS: 0
NAUSEA: 0
ABDOMINAL PAIN: 1
FEVER: 0
SHORTNESS OF BREATH: 0
COUGH: 0
CHILLS: 0

## 2022-06-22 NOTE — CARE PLAN
Problem: Pain - Standard  Goal: Alleviation of pain or a reduction in pain to the patient’s comfort goal  Outcome: Progressing     Problem: Knowledge Deficit - Standard  Goal: Patient and family/care givers will demonstrate understanding of plan of care, disease process/condition, diagnostic tests and medications  Outcome: Progressing     The patient is Stable - Low risk of patient condition declining or worsening    Shift Goals  Clinical Goals: pain control; sx Thurs  Patient Goals: rest    Progress made toward(s) clinical / shift goals:  Pain managed with PRN medication, pt reports effective relief. POC reviewed, pt verbalizes understanding    Patient is not progressing towards the following goals:

## 2022-06-22 NOTE — PROGRESS NOTES
GYN/Oncology Progress Note               Author: HERMINIA Molina Date & Time created: 6/22/2022  2:50 PM     Interval History:  Patient doing well, pain well controlled with MS and percocet. Denies any nausea at this time.     Review of Systems:  Review of Systems   Constitutional: Negative for chills and fever.   Respiratory: Negative for cough and shortness of breath.    Cardiovascular: Negative for chest pain and leg swelling.   Gastrointestinal: Positive for abdominal pain (controlled). Negative for nausea and vomiting.   Genitourinary: Negative for dysuria and urgency.   Neurological: Negative for dizziness.       Physical Exam:  Physical Exam  Constitutional:       Appearance: Normal appearance.   Cardiovascular:      Pulses: Normal pulses.   Pulmonary:      Effort: Pulmonary effort is normal.   Abdominal:      General: There is distension.      Palpations: There is mass.      Tenderness: There is abdominal tenderness.   Skin:     General: Skin is warm and dry.      Capillary Refill: Capillary refill takes 2 to 3 seconds.   Neurological:      Mental Status: She is alert and oriented to person, place, and time.         Labs:          Recent Labs     06/20/22  0940 06/22/22  0305   SODIUM 138 138   POTASSIUM 3.9 4.0   CHLORIDE 102 105   CO2 23 24   BUN 8 12   CREATININE 0.80 0.79   CALCIUM 8.9 8.7     Recent Labs     06/20/22  0940 06/22/22  0305   ALTSGPT 11  --    ASTSGOT 21  --    ALKPHOSPHAT 59  --    TBILIRUBIN 0.4  --    LIPASE 14  --    GLUCOSE 80 87     Recent Labs     06/20/22  1147 06/22/22  0305   RBC 4.40 3.98*   HEMOGLOBIN 12.2 11.1*   HEMATOCRIT 37.9 35.3*   PLATELETCT 451* 420   PROTHROMBTM  --  14.5   APTT  --  34.3   INR  --  1.17*     Recent Labs     06/20/22  0940 06/20/22  1147 06/22/22  0305   WBC  --  6.7 5.4   NEUTSPOLYS  --  70.10 55.70   LYMPHOCYTES  --  21.70* 30.30   MONOCYTES  --  5.90 9.10   EOSINOPHILS  --  1.80 4.30   BASOPHILS  --  0.40 0.40   ASTSGOT 21  --   --     ALTSGPT 11  --   --    ALKPHOSPHAT 59  --   --    TBILIRUBIN 0.4  --   --      Recent Labs     22  0940 22  0305   SODIUM 138 138   POTASSIUM 3.9 4.0   CHLORIDE 102 105   CO2 23 24   GLUCOSE 80 87   BUN 8 12   CREATININE 0.80 0.79   CALCIUM 8.9 8.7     Hemodynamics:  Temp (24hrs), Av.4 °C (97.6 °F), Min:36.3 °C (97.4 °F), Max:36.6 °C (97.8 °F)  Temperature: 36.3 °C (97.4 °F)  Pulse  Av.8  Min: 52  Max: 112   Blood Pressure: 137/88     Respiratory:    Respiration: 17, Pulse Oximetry: 97 %        RUL Breath Sounds: Clear, RML Breath Sounds: Clear, RLL Breath Sounds: Diminished, SHAWNA Breath Sounds: Clear, LLL Breath Sounds: Diminished  Fluids:  No intake or output data in the 24 hours ending 22 0827    GI/Nutrition:  Orders Placed This Encounter   Procedures   • Diet Order Diet: Clear Liquid     Standing Status:   Standing     Number of Occurrences:   1     Order Specific Question:   Diet:     Answer:   Clear Liquid [10]     Medical Decision Making, by Problem:  Active Hospital Problems    Diagnosis    • *Pelvic mass [R19.00]    • Abdominal pain [R10.9]        Plan:  37-year-old female presents with 30 cm mass with elevated  of 134 with a CEA of 94.3.      1. Pelvic mass: This is quite concerning for possible mucinous cystadenocarcinoma although a GI primary cannot be ruled out.  Should be noted however that patient has no GI symptoms to suspect that this may be a Krukenberg tumor.  We will go ahead and obtain a guaiac of the stool if it is positive patient should undergo a colonoscopy. If negative plan to proceed with surgery on Thursday with Dr. Gaspar. Bowel prep today, NPO at midnight.   2. Abdominal pain: morphine and percocet PRN    Case discussed with Dr. Gaspar.     Quality-Core Measures

## 2022-06-22 NOTE — PROGRESS NOTES
Bedside report received.  Assessment complete.  A&O x 4. Patient calls appropriately.  Patient ambulates with one person assist.    Patient has 3/10 pain. Pain managed with prescribed medications.  Denies N&V. Tolerating Clears diet.  Abdomen rounded.  + void, + flatus, + BM.  Patient pleasant with staff and resting in bed.  Review plan with of care with patient. Call light and personal belongings within reach. Hourly rounding in place. All needs met at this time.

## 2022-06-23 ENCOUNTER — ANESTHESIA EVENT (OUTPATIENT)
Dept: SURGERY | Facility: MEDICAL CENTER | Age: 37
DRG: 742 | End: 2022-06-23
Payer: COMMERCIAL

## 2022-06-23 ENCOUNTER — ANESTHESIA (OUTPATIENT)
Dept: SURGERY | Facility: MEDICAL CENTER | Age: 37
DRG: 742 | End: 2022-06-23
Payer: COMMERCIAL

## 2022-06-23 ENCOUNTER — APPOINTMENT (OUTPATIENT)
Dept: RADIOLOGY | Facility: MEDICAL CENTER | Age: 37
DRG: 742 | End: 2022-06-23
Attending: NURSE PRACTITIONER
Payer: COMMERCIAL

## 2022-06-23 LAB
HCG UR QL: NEGATIVE
PATHOLOGY CONSULT NOTE: NORMAL

## 2022-06-23 PROCEDURE — 160036 HCHG PACU - EA ADDL 30 MINS PHASE I: Performed by: SPECIALIST

## 2022-06-23 PROCEDURE — 700111 HCHG RX REV CODE 636 W/ 250 OVERRIDE (IP): Performed by: NURSE PRACTITIONER

## 2022-06-23 PROCEDURE — 88305 TISSUE EXAM BY PATHOLOGIST: CPT

## 2022-06-23 PROCEDURE — 0DBU0ZZ EXCISION OF OMENTUM, OPEN APPROACH: ICD-10-PCS | Performed by: SPECIALIST

## 2022-06-23 PROCEDURE — 700111 HCHG RX REV CODE 636 W/ 250 OVERRIDE (IP): Performed by: ANESTHESIOLOGY

## 2022-06-23 PROCEDURE — 770001 HCHG ROOM/CARE - MED/SURG/GYN PRIV*

## 2022-06-23 PROCEDURE — 160042 HCHG SURGERY MINUTES - EA ADDL 1 MIN LEVEL 5: Performed by: SPECIALIST

## 2022-06-23 PROCEDURE — 160009 HCHG ANES TIME/MIN: Performed by: SPECIALIST

## 2022-06-23 PROCEDURE — 88307 TISSUE EXAM BY PATHOLOGIST: CPT

## 2022-06-23 PROCEDURE — C1765 ADHESION BARRIER: HCPCS | Performed by: SPECIALIST

## 2022-06-23 PROCEDURE — 88112 CYTOPATH CELL ENHANCE TECH: CPT

## 2022-06-23 PROCEDURE — 700105 HCHG RX REV CODE 258: Performed by: ANESTHESIOLOGY

## 2022-06-23 PROCEDURE — 700102 HCHG RX REV CODE 250 W/ 637 OVERRIDE(OP): Performed by: ANESTHESIOLOGY

## 2022-06-23 PROCEDURE — A9270 NON-COVERED ITEM OR SERVICE: HCPCS | Performed by: ANESTHESIOLOGY

## 2022-06-23 PROCEDURE — 160002 HCHG RECOVERY MINUTES (STAT): Performed by: SPECIALIST

## 2022-06-23 PROCEDURE — 700105 HCHG RX REV CODE 258: Performed by: SPECIALIST

## 2022-06-23 PROCEDURE — 0UT10ZZ RESECTION OF LEFT OVARY, OPEN APPROACH: ICD-10-PCS | Performed by: SPECIALIST

## 2022-06-23 PROCEDURE — 160031 HCHG SURGERY MINUTES - 1ST 30 MINS LEVEL 5: Performed by: SPECIALIST

## 2022-06-23 PROCEDURE — 160035 HCHG PACU - 1ST 60 MINS PHASE I: Performed by: SPECIALIST

## 2022-06-23 PROCEDURE — 88331 PATH CONSLTJ SURG 1 BLK 1SPC: CPT

## 2022-06-23 PROCEDURE — 0DTJ0ZZ RESECTION OF APPENDIX, OPEN APPROACH: ICD-10-PCS | Performed by: SPECIALIST

## 2022-06-23 PROCEDURE — 81025 URINE PREGNANCY TEST: CPT

## 2022-06-23 PROCEDURE — 160048 HCHG OR STATISTICAL LEVEL 1-5: Performed by: SPECIALIST

## 2022-06-23 PROCEDURE — 700111 HCHG RX REV CODE 636 W/ 250 OVERRIDE (IP): Performed by: SPECIALIST

## 2022-06-23 PROCEDURE — 0UT60ZZ RESECTION OF LEFT FALLOPIAN TUBE, OPEN APPROACH: ICD-10-PCS | Performed by: SPECIALIST

## 2022-06-23 PROCEDURE — 88304 TISSUE EXAM BY PATHOLOGIST: CPT

## 2022-06-23 PROCEDURE — 110371 HCHG SHELL REV 272: Performed by: SPECIALIST

## 2022-06-23 PROCEDURE — 74018 RADEX ABDOMEN 1 VIEW: CPT

## 2022-06-23 PROCEDURE — 00840 ANES IPER PX LOWER ABD NOS: CPT | Performed by: ANESTHESIOLOGY

## 2022-06-23 PROCEDURE — 700101 HCHG RX REV CODE 250: Performed by: ANESTHESIOLOGY

## 2022-06-23 RX ORDER — METOPROLOL TARTRATE 1 MG/ML
1 INJECTION, SOLUTION INTRAVENOUS
Status: DISCONTINUED | OUTPATIENT
Start: 2022-06-23 | End: 2022-06-23 | Stop reason: HOSPADM

## 2022-06-23 RX ORDER — METOCLOPRAMIDE HYDROCHLORIDE 5 MG/ML
10 INJECTION INTRAMUSCULAR; INTRAVENOUS
Status: COMPLETED | OUTPATIENT
Start: 2022-06-23 | End: 2022-06-23

## 2022-06-23 RX ORDER — OXYCODONE HYDROCHLORIDE AND ACETAMINOPHEN 5; 325 MG/1; MG/1
2 TABLET ORAL
Status: COMPLETED | OUTPATIENT
Start: 2022-06-23 | End: 2022-06-23

## 2022-06-23 RX ORDER — DEXAMETHASONE SODIUM PHOSPHATE 4 MG/ML
INJECTION, SOLUTION INTRA-ARTICULAR; INTRALESIONAL; INTRAMUSCULAR; INTRAVENOUS; SOFT TISSUE PRN
Status: DISCONTINUED | OUTPATIENT
Start: 2022-06-23 | End: 2022-06-23 | Stop reason: SURG

## 2022-06-23 RX ORDER — OXYCODONE HYDROCHLORIDE AND ACETAMINOPHEN 5; 325 MG/1; MG/1
1 TABLET ORAL
Status: COMPLETED | OUTPATIENT
Start: 2022-06-23 | End: 2022-06-23

## 2022-06-23 RX ORDER — CEFAZOLIN SODIUM 1 G/3ML
INJECTION, POWDER, FOR SOLUTION INTRAMUSCULAR; INTRAVENOUS PRN
Status: DISCONTINUED | OUTPATIENT
Start: 2022-06-23 | End: 2022-06-23 | Stop reason: SURG

## 2022-06-23 RX ORDER — ONDANSETRON 2 MG/ML
4 INJECTION INTRAMUSCULAR; INTRAVENOUS EVERY 6 HOURS PRN
Status: DISCONTINUED | OUTPATIENT
Start: 2022-06-23 | End: 2022-06-27 | Stop reason: HOSPADM

## 2022-06-23 RX ORDER — MEPERIDINE HYDROCHLORIDE 25 MG/ML
12.5 INJECTION INTRAMUSCULAR; INTRAVENOUS; SUBCUTANEOUS
Status: DISCONTINUED | OUTPATIENT
Start: 2022-06-23 | End: 2022-06-23 | Stop reason: HOSPADM

## 2022-06-23 RX ORDER — HYDROMORPHONE HYDROCHLORIDE 1 MG/ML
0.2 INJECTION, SOLUTION INTRAMUSCULAR; INTRAVENOUS; SUBCUTANEOUS
Status: DISCONTINUED | OUTPATIENT
Start: 2022-06-23 | End: 2022-06-23 | Stop reason: HOSPADM

## 2022-06-23 RX ORDER — ROCURONIUM BROMIDE 10 MG/ML
INJECTION, SOLUTION INTRAVENOUS PRN
Status: DISCONTINUED | OUTPATIENT
Start: 2022-06-23 | End: 2022-06-23 | Stop reason: SURG

## 2022-06-23 RX ORDER — SODIUM CHLORIDE, SODIUM LACTATE, POTASSIUM CHLORIDE, CALCIUM CHLORIDE 600; 310; 30; 20 MG/100ML; MG/100ML; MG/100ML; MG/100ML
INJECTION, SOLUTION INTRAVENOUS
Status: DISCONTINUED | OUTPATIENT
Start: 2022-06-23 | End: 2022-06-23 | Stop reason: SURG

## 2022-06-23 RX ORDER — SUCCINYLCHOLINE CHLORIDE 20 MG/ML
INJECTION INTRAMUSCULAR; INTRAVENOUS PRN
Status: DISCONTINUED | OUTPATIENT
Start: 2022-06-23 | End: 2022-06-23 | Stop reason: SURG

## 2022-06-23 RX ORDER — HYDROMORPHONE HYDROCHLORIDE 1 MG/ML
0.1 INJECTION, SOLUTION INTRAMUSCULAR; INTRAVENOUS; SUBCUTANEOUS
Status: DISCONTINUED | OUTPATIENT
Start: 2022-06-23 | End: 2022-06-23 | Stop reason: HOSPADM

## 2022-06-23 RX ORDER — ALBUTEROL SULFATE 2.5 MG/3ML
2.5 SOLUTION RESPIRATORY (INHALATION)
Status: DISCONTINUED | OUTPATIENT
Start: 2022-06-23 | End: 2022-06-23 | Stop reason: HOSPADM

## 2022-06-23 RX ORDER — ONDANSETRON 2 MG/ML
INJECTION INTRAMUSCULAR; INTRAVENOUS PRN
Status: DISCONTINUED | OUTPATIENT
Start: 2022-06-23 | End: 2022-06-23 | Stop reason: SURG

## 2022-06-23 RX ORDER — SODIUM CHLORIDE, SODIUM GLUCONATE, SODIUM ACETATE, POTASSIUM CHLORIDE AND MAGNESIUM CHLORIDE 526; 502; 368; 37; 30 MG/100ML; MG/100ML; MG/100ML; MG/100ML; MG/100ML
INJECTION, SOLUTION INTRAVENOUS
Status: DISCONTINUED | OUTPATIENT
Start: 2022-06-23 | End: 2022-06-23 | Stop reason: SURG

## 2022-06-23 RX ORDER — DIPHENHYDRAMINE HYDROCHLORIDE 50 MG/ML
12.5 INJECTION INTRAMUSCULAR; INTRAVENOUS
Status: DISCONTINUED | OUTPATIENT
Start: 2022-06-23 | End: 2022-06-23 | Stop reason: HOSPADM

## 2022-06-23 RX ORDER — LABETALOL HYDROCHLORIDE 5 MG/ML
5 INJECTION, SOLUTION INTRAVENOUS
Status: DISCONTINUED | OUTPATIENT
Start: 2022-06-23 | End: 2022-06-23 | Stop reason: HOSPADM

## 2022-06-23 RX ORDER — HYDROMORPHONE HYDROCHLORIDE 1 MG/ML
0.4 INJECTION, SOLUTION INTRAMUSCULAR; INTRAVENOUS; SUBCUTANEOUS
Status: DISCONTINUED | OUTPATIENT
Start: 2022-06-23 | End: 2022-06-23 | Stop reason: HOSPADM

## 2022-06-23 RX ORDER — MIDAZOLAM HYDROCHLORIDE 1 MG/ML
INJECTION INTRAMUSCULAR; INTRAVENOUS PRN
Status: DISCONTINUED | OUTPATIENT
Start: 2022-06-23 | End: 2022-06-23 | Stop reason: SURG

## 2022-06-23 RX ORDER — HALOPERIDOL 5 MG/ML
1 INJECTION INTRAMUSCULAR
Status: DISCONTINUED | OUTPATIENT
Start: 2022-06-23 | End: 2022-06-23 | Stop reason: HOSPADM

## 2022-06-23 RX ORDER — HYDROMORPHONE HYDROCHLORIDE 2 MG/ML
INJECTION, SOLUTION INTRAMUSCULAR; INTRAVENOUS; SUBCUTANEOUS PRN
Status: DISCONTINUED | OUTPATIENT
Start: 2022-06-23 | End: 2022-06-23 | Stop reason: SURG

## 2022-06-23 RX ORDER — HYDRALAZINE HYDROCHLORIDE 20 MG/ML
5 INJECTION INTRAMUSCULAR; INTRAVENOUS
Status: DISCONTINUED | OUTPATIENT
Start: 2022-06-23 | End: 2022-06-23 | Stop reason: HOSPADM

## 2022-06-23 RX ORDER — SODIUM CHLORIDE, SODIUM LACTATE, POTASSIUM CHLORIDE, CALCIUM CHLORIDE 600; 310; 30; 20 MG/100ML; MG/100ML; MG/100ML; MG/100ML
INJECTION, SOLUTION INTRAVENOUS CONTINUOUS
Status: DISCONTINUED | OUTPATIENT
Start: 2022-06-23 | End: 2022-06-23 | Stop reason: HOSPADM

## 2022-06-23 RX ADMIN — HYDROMORPHONE HYDROCHLORIDE 2 MG: 2 INJECTION INTRAMUSCULAR; INTRAVENOUS; SUBCUTANEOUS at 08:51

## 2022-06-23 RX ADMIN — HYDROMORPHONE HYDROCHLORIDE 0.4 MG: 1 INJECTION, SOLUTION INTRAMUSCULAR; INTRAVENOUS; SUBCUTANEOUS at 16:16

## 2022-06-23 RX ADMIN — FENTANYL CITRATE 50 MCG: 50 INJECTION, SOLUTION INTRAMUSCULAR; INTRAVENOUS at 15:00

## 2022-06-23 RX ADMIN — HYDROMORPHONE HYDROCHLORIDE 0.5 MG: 2 INJECTION INTRAMUSCULAR; INTRAVENOUS; SUBCUTANEOUS at 13:47

## 2022-06-23 RX ADMIN — METOCLOPRAMIDE 10 MG: 5 INJECTION, SOLUTION INTRAMUSCULAR; INTRAVENOUS at 09:43

## 2022-06-23 RX ADMIN — SODIUM CHLORIDE, POTASSIUM CHLORIDE, SODIUM LACTATE AND CALCIUM CHLORIDE: 600; 310; 30; 20 INJECTION, SOLUTION INTRAVENOUS at 01:21

## 2022-06-23 RX ADMIN — HYDROMORPHONE HYDROCHLORIDE 2 MG: 2 INJECTION INTRAMUSCULAR; INTRAVENOUS; SUBCUTANEOUS at 04:58

## 2022-06-23 RX ADMIN — ROCURONIUM BROMIDE 10 MG: 10 INJECTION, SOLUTION INTRAVENOUS at 13:34

## 2022-06-23 RX ADMIN — HYDROMORPHONE HYDROCHLORIDE 0.2 MG: 1 INJECTION, SOLUTION INTRAMUSCULAR; INTRAVENOUS; SUBCUTANEOUS at 15:34

## 2022-06-23 RX ADMIN — SODIUM CHLORIDE, POTASSIUM CHLORIDE, SODIUM LACTATE AND CALCIUM CHLORIDE: 600; 310; 30; 20 INJECTION, SOLUTION INTRAVENOUS at 11:40

## 2022-06-23 RX ADMIN — HYDROMORPHONE HYDROCHLORIDE 0.4 MG: 1 INJECTION, SOLUTION INTRAMUSCULAR; INTRAVENOUS; SUBCUTANEOUS at 15:21

## 2022-06-23 RX ADMIN — MEPERIDINE HYDROCHLORIDE 12.5 MG: 25 INJECTION INTRAMUSCULAR; INTRAVENOUS; SUBCUTANEOUS at 14:50

## 2022-06-23 RX ADMIN — Medication: at 17:28

## 2022-06-23 RX ADMIN — HYDROMORPHONE HYDROCHLORIDE 0.5 MG: 2 INJECTION INTRAMUSCULAR; INTRAVENOUS; SUBCUTANEOUS at 13:08

## 2022-06-23 RX ADMIN — SODIUM CHLORIDE, POTASSIUM CHLORIDE, SODIUM LACTATE AND CALCIUM CHLORIDE: 600; 310; 30; 20 INJECTION, SOLUTION INTRAVENOUS at 17:20

## 2022-06-23 RX ADMIN — MIDAZOLAM HYDROCHLORIDE 1 MG: 1 INJECTION, SOLUTION INTRAMUSCULAR; INTRAVENOUS at 11:40

## 2022-06-23 RX ADMIN — OXYCODONE HYDROCHLORIDE AND ACETAMINOPHEN 2 TABLET: 5; 325 TABLET ORAL at 15:57

## 2022-06-23 RX ADMIN — HYDROMORPHONE HYDROCHLORIDE 1 MG: 2 INJECTION INTRAMUSCULAR; INTRAVENOUS; SUBCUTANEOUS at 12:10

## 2022-06-23 RX ADMIN — ROCURONIUM BROMIDE 5 MG: 10 INJECTION, SOLUTION INTRAVENOUS at 11:50

## 2022-06-23 RX ADMIN — FENTANYL CITRATE 50 MCG: 50 INJECTION, SOLUTION INTRAMUSCULAR; INTRAVENOUS at 12:28

## 2022-06-23 RX ADMIN — HYDROMORPHONE HYDROCHLORIDE 2 MG: 2 INJECTION INTRAMUSCULAR; INTRAVENOUS; SUBCUTANEOUS at 01:44

## 2022-06-23 RX ADMIN — FENTANYL CITRATE 50 MCG: 50 INJECTION, SOLUTION INTRAMUSCULAR; INTRAVENOUS at 15:04

## 2022-06-23 RX ADMIN — SODIUM CHLORIDE, SODIUM GLUCONATE, SODIUM ACETATE, POTASSIUM CHLORIDE AND MAGNESIUM CHLORIDE: 526; 502; 368; 37; 30 INJECTION, SOLUTION INTRAVENOUS at 11:55

## 2022-06-23 RX ADMIN — SODIUM CHLORIDE, SODIUM GLUCONATE, SODIUM ACETATE, POTASSIUM CHLORIDE AND MAGNESIUM CHLORIDE: 526; 502; 368; 37; 30 INJECTION, SOLUTION INTRAVENOUS at 13:04

## 2022-06-23 RX ADMIN — HYDROMORPHONE HYDROCHLORIDE 0.4 MG: 1 INJECTION, SOLUTION INTRAMUSCULAR; INTRAVENOUS; SUBCUTANEOUS at 15:28

## 2022-06-23 RX ADMIN — FENTANYL CITRATE 25 MCG: 50 INJECTION, SOLUTION INTRAMUSCULAR; INTRAVENOUS at 14:50

## 2022-06-23 RX ADMIN — ROCURONIUM BROMIDE 65 MG: 10 INJECTION, SOLUTION INTRAVENOUS at 11:55

## 2022-06-23 RX ADMIN — DEXAMETHASONE SODIUM PHOSPHATE 8 MG: 4 INJECTION, SOLUTION INTRA-ARTICULAR; INTRALESIONAL; INTRAMUSCULAR; INTRAVENOUS; SOFT TISSUE at 11:44

## 2022-06-23 RX ADMIN — ONDANSETRON 4 MG: 2 INJECTION INTRAMUSCULAR; INTRAVENOUS at 14:11

## 2022-06-23 RX ADMIN — ROCURONIUM BROMIDE 10 MG: 10 INJECTION, SOLUTION INTRAVENOUS at 13:05

## 2022-06-23 RX ADMIN — PROPOFOL 150 MG: 10 INJECTION, EMULSION INTRAVENOUS at 11:50

## 2022-06-23 RX ADMIN — SODIUM CHLORIDE, POTASSIUM CHLORIDE, SODIUM LACTATE AND CALCIUM CHLORIDE: 600; 310; 30; 20 INJECTION, SOLUTION INTRAVENOUS at 12:55

## 2022-06-23 RX ADMIN — SUCCINYLCHOLINE CHLORIDE 140 MG: 20 INJECTION, SOLUTION INTRAMUSCULAR; INTRAVENOUS; PARENTERAL at 11:50

## 2022-06-23 RX ADMIN — SUGAMMADEX 400 MG: 100 INJECTION, SOLUTION INTRAVENOUS at 14:11

## 2022-06-23 RX ADMIN — ROCURONIUM BROMIDE 10 MG: 10 INJECTION, SOLUTION INTRAVENOUS at 12:28

## 2022-06-23 RX ADMIN — CEFAZOLIN 3 G: 330 INJECTION, POWDER, FOR SOLUTION INTRAMUSCULAR; INTRAVENOUS at 12:07

## 2022-06-23 RX ADMIN — FENTANYL CITRATE 50 MCG: 50 INJECTION, SOLUTION INTRAMUSCULAR; INTRAVENOUS at 13:30

## 2022-06-23 RX ADMIN — FENTANYL CITRATE 100 MCG: 50 INJECTION, SOLUTION INTRAMUSCULAR; INTRAVENOUS at 11:50

## 2022-06-23 ASSESSMENT — ENCOUNTER SYMPTOMS
DIZZINESS: 0
CHILLS: 0
ABDOMINAL PAIN: 1
VOMITING: 0
COUGH: 0
FEVER: 0
SHORTNESS OF BREATH: 0
NAUSEA: 0

## 2022-06-23 ASSESSMENT — PAIN DESCRIPTION - PAIN TYPE
TYPE: ACUTE PAIN
TYPE: SURGICAL PAIN
TYPE: SURGICAL PAIN
TYPE: ACUTE PAIN
TYPE: SURGICAL PAIN
TYPE: ACUTE PAIN
TYPE: SURGICAL PAIN
TYPE: SURGICAL PAIN

## 2022-06-23 ASSESSMENT — PAIN SCALES - GENERAL: PAIN_LEVEL: 4

## 2022-06-23 NOTE — ANESTHESIA TIME REPORT
Anesthesia Start and Stop Event Times     Date Time Event    6/23/2022 1000 Ready for Procedure     1140 Anesthesia Start     1451 Anesthesia Stop        Responsible Staff  06/23/22    Name Role Begin End    Wilton Will D.O. Anesth 1140 1451        Overtime Reason:  no overtime (within assigned shift)    Comments:

## 2022-06-23 NOTE — OR SURGEON
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    Anesthesiologist/Type of Anesthesia:  Anesthesiologist: Wilton Will D.O./General    Surgical Staff:  Assistant: HERMINIA Molina  Circulator: Raquel Benedict R.N.  Relief Circulator: Kelsey Cabrera R.N.  Scrub Person: Maame Gerard    Specimens removed if any:  ID Type Source Tests Collected by Time Destination   A : pelvic fluid Other Other PATHOLOGY SPECIMEN Remi Gaspar M.D. 6/23/2022 12:36 PM    B : left fallopian tube and ovary Tissue Ovary PATHOLOGY SPECIMEN Remi Gaspar M.D. 6/23/2022 12:51 PM    C : left adnexa Other Other PATHOLOGY SPECIMEN Remi Gaspar M.D. 6/23/2022  1:00 PM    D : Omentum Other Other PATHOLOGY SPECIMEN Remi Gaspar M.D. 6/23/2022  1:23 PM    E : appendix Tissue Appendix PATHOLOGY SPECIMEN Remi Gaspar M.D. 6/23/2022  1:34 PM        Estimated Blood Loss: 50 cc    Findings: Approximately a liter of ascites was noted with gelatinous material.  This is aggravated ruptured.  Upon entry.  The fluid was clinically consistent with mucinous gelatinous material.    Normal right and left diaphragm liver capsule smooth stomach appeared grossly normal abdominal peritoneal surfaces were unremarkable omentum appeared grossly normal the spleen was unremarkable.  The paracolic gutter peritoneum was free of seeding.  Gerota's fascia bilaterally were unremarkable.  Small bowel was inspected from ligament of Treitz to the ileocecal valve there was no evidence of mesenteric or  serosal disease.  The omentum and Bend appear grossly normal.    The pelvis uterus is of normal size the right tube and ovary was unremarkable however the left ovary was approximately 30 cm about the size of a beach ball.  The cyst had already ruptured with gelatinous material extruding out.  The appendix appeared grossly normal.  There was no seeding along the bladder pelvic and cul-de-sac peritoneum.    Retroperitoneally pelvic and periaortic lymph nodes were palpated to be grossly normal.    Complications: none        6/23/2022 3:15 PM Remi Gaspar M.D.

## 2022-06-23 NOTE — PROGRESS NOTES
"Pt encouraged to consume Golytely, stressed the importance of bowel prep prior to surgery. Pt acknowledges understanding, states that drinking it is causing a lot pain. Pain medicated per MAR    Pt instructed that entire bottle needs to be consumed by midnight, at which point she will be strict NPO, and also a stool sample is still needed. Pt states \"I haven't been able to poop yet.\"  At this point about 2 of the 4 liters has been consumed    Will continue to round frequently to encourage bowel prep to be finished by midnight and bowel movement   "

## 2022-06-23 NOTE — DOCUMENTATION QUERY
[facility]                                                                       Query Response Note      PATIENT:               STEVEN DEAN  ACCT #:                  [id number]  MRN:                     [id number]  :                      1985  ADMIT DATE:       2022 9:07 AM  DISCH DATE:          RESPONDING  PROVIDER #:        [id number]           QUERY TEXT:    BMI >40 is documented in the Medical Record.  Can a diagnosis be provided to support this finding?    NOTE:  If an appropriate response is not listed below, please respond with a new note.    The patient's Clinical Indicators include:   RD Eval: BMI >40 (=Body mass index is 48.11 kg/m².), Class III obesity.    Treatment: RD eval; if appropriate at DC refer to OP nutrition services for wt. mgmt.  Risk Factors: Excess calories    Thank You,  Lily Mo RN  Clinical    Connect via [email or web address]  Options provided:   -- Morbidly obese   -- Other explanation, (please specify other explanation)   -- Findings of no clinical significance   -- Unable to determine      Query created by: Lily Mo on 2022 11:58 AM    RESPONSE TEXT:    Morbidly obese          Electronically signed by:  JASMYNE HAMILTON MD 2022 10:20 PM

## 2022-06-23 NOTE — ANESTHESIA PROCEDURE NOTES
Peripheral IV    Date/Time: 6/23/2022 11:55 AM  Performed by: Wilton Will D.O.  Authorized by: Wilton Will D.O.     Size:  18 G  Laterality:  Right  Site Prep:  Alcohol  Technique:  Direct puncture  Attempts:  1

## 2022-06-23 NOTE — ANESTHESIA PROCEDURE NOTES
Airway    Date/Time: 6/23/2022 11:50 AM  Performed by: Wilton Will D.O.  Authorized by: Wilton Will D.O.     Location:  OR  Urgency:  Elective  Indications for Airway Management:  Anesthesia      Spontaneous Ventilation: absent    Sedation Level:  Deep  Preoxygenated: Yes    Patient Position:  Sniffing  Mask Difficulty Assessment:  0 - not attempted  Final Airway Type:  Endotracheal airway  Final Endotracheal Airway:  ETT  Cuffed: Yes    Technique Used for Successful ETT Placement:  Direct laryngoscopy  Devices/Methods Used in Placement:  Cricoid pressure and intubating stylet    Insertion Site:  Oral  Blade Type:  Mónica  Laryngoscope Blade/Videolaryngoscope Blade Size:  3  ETT Size (mm):  7.5  Measured from:  Lips  ETT to Lips (cm):  21  Placement Verified by: auscultation and capnometry    Cormack-Lehane Classification:  Grade I - full view of glottis  Number of Attempts at Approach:  1

## 2022-06-23 NOTE — CARE PLAN
The patient is Stable - Low risk of patient condition declining or worsening    Shift Goals  Clinical Goals: Pain Management, Surgery prep  Patient Goals: Pain Management, Surgery prep    Progress made toward(s) clinical / shift goals:  Pain Management, Surgery prep

## 2022-06-23 NOTE — PROGRESS NOTES
GYN/Oncology Progress Note               Author: HERMINIA Molina Date & Time created: 2022  9:43 AM     Interval History:  Patient doing well, having increased pain, requiring IV dilaudid. Denies any nausea at this time.     Review of Systems:  Review of Systems   Constitutional: Negative for chills and fever.   Respiratory: Negative for cough and shortness of breath.    Cardiovascular: Negative for chest pain and leg swelling.   Gastrointestinal: Positive for abdominal pain. Negative for nausea and vomiting.   Genitourinary: Negative for dysuria and urgency.   Neurological: Negative for dizziness.       Physical Exam:  Physical Exam  Constitutional:       Appearance: Normal appearance.   Cardiovascular:      Pulses: Normal pulses.   Pulmonary:      Effort: Pulmonary effort is normal.   Abdominal:      General: There is distension.      Palpations: There is mass.      Tenderness: There is abdominal tenderness.   Skin:     General: Skin is warm and dry.      Capillary Refill: Capillary refill takes 2 to 3 seconds.   Neurological:      Mental Status: She is alert and oriented to person, place, and time.         Labs:          Recent Labs     22  0305   SODIUM 138   POTASSIUM 4.0   CHLORIDE 105   CO2 24   BUN 12   CREATININE 0.79   CALCIUM 8.7     Recent Labs     22  0305   GLUCOSE 87     Recent Labs     22  1147 22  0305   RBC 4.40 3.98*   HEMOGLOBIN 12.2 11.1*   HEMATOCRIT 37.9 35.3*   PLATELETCT 451* 420   PROTHROMBTM  --  14.5   APTT  --  34.3   INR  --  1.17*     Recent Labs     22  1147 22  0305   WBC 6.7 5.4   NEUTSPOLYS 70.10 55.70   LYMPHOCYTES 21.70* 30.30   MONOCYTES 5.90 9.10   EOSINOPHILS 1.80 4.30   BASOPHILS 0.40 0.40     Recent Labs     22  0305   SODIUM 138   POTASSIUM 4.0   CHLORIDE 105   CO2 24   GLUCOSE 87   BUN 12   CREATININE 0.79   CALCIUM 8.7     Hemodynamics:  Temp (24hrs), Av.7 °C (98 °F), Min:36.3 °C (97.3 °F), Max:37.1 °C (98.7  °F)  Temperature: 36.7 °C (98 °F)  Pulse  Av.3  Min: 52  Max: 112   Blood Pressure: (!) 144/80     Respiratory:    Respiration: 20, Pulse Oximetry: 96 %        RUL Breath Sounds: Clear, RML Breath Sounds: Clear, RLL Breath Sounds: Diminished, SHAWNA Breath Sounds: Clear, LLL Breath Sounds: Diminished  Fluids:  No intake or output data in the 24 hours ending 22 0827    GI/Nutrition:  Orders Placed This Encounter   Procedures   • Diet NPO Restrict to: Strict     Standing Status:   Standing     Number of Occurrences:   8     Order Specific Question:   Diet NPO Restrict to:     Answer:   Strict [1]     Medical Decision Making, by Problem:  Active Hospital Problems    Diagnosis    • *Pelvic mass [R19.00]    • Abdominal pain [R10.9]        Plan:  37-year-old female presents with 30 cm mass with elevated  of 134 with a CEA of 94.3.      1. Pelvic mass: This is quite concerning for possible mucinous cystadenocarcinoma although a GI primary cannot be ruled out.  Should be noted however that patient has no GI symptoms to suspect that this may be a Krukenberg tumor.  Stool guiac negative. plan to proceed with surgery today.     2. Abdominal pain: IV dilaudid PRN    Seen in conjunction with Dr. Gaspar.     Quality-Core Measures

## 2022-06-23 NOTE — OR NURSING
PT arrived to PACU from OR. VSS. PT reporting severe surgical site pain, Pt medicated per MAR. Denies nausea. Dressing to midline ABD CDI. Post-op Xray obtained. Mother updated on plan of care. No belongings with patient in PACU. Encouraged patient to work on coughing and deep breathing. PT reluctant due to discomfort, splinting technique with pillow shown to PT and encouraged use of this technique to help with coughing and deep breathing. Report called to Madai ELMORE. Handoff given to CNA to transport patient back to room, face mask applied to patient for transport.

## 2022-06-23 NOTE — PROGRESS NOTES
This RN has paged [facility] x2 to notify of patients uncontrolled pain.   Awaiting new orders.  ~1853 New orders to change IV push. See MAR

## 2022-06-23 NOTE — CARE PLAN
Problem: Pain - Standard  Goal: Alleviation of pain or a reduction in pain to the patient’s comfort goal  Outcome: Progressing     Problem: Knowledge Deficit - Standard  Goal: Patient and family/care givers will demonstrate understanding of plan of care, disease process/condition, diagnostic tests and medications  Outcome: Progressing     Problem: Psychosocial  Goal: Patient's level of anxiety will decrease  Outcome: Progressing  Goal: Patient's ability to verbalize feelings about condition will improve  Outcome: Progressing  Goal: Patient's ability to re-evaluate and adapt role responsibilities will improve  Outcome: Progressing  Goal: Patient and family will demonstrate ability to cope with life altering diagnosis and/or procedure  Outcome: Progressing  Goal: Spiritual and cultural needs incorporated into hospitalization  Outcome: Progressing     Problem: Communication  Goal: The ability to communicate needs accurately and effectively will improve  Outcome: Progressing     Problem: Discharge Barriers/Planning  Goal: Patient's continuum of care needs are met  Outcome: Progressing     Problem: Hemodynamics  Goal: Patient's hemodynamics, fluid balance and neurologic status will be stable or improve  Outcome: Progressing     Problem: Respiratory  Goal: Patient will achieve/maintain optimum respiratory ventilation and gas exchange  Outcome: Progressing     Problem: Fluid Volume  Goal: Fluid volume balance will be maintained  Outcome: Progressing     Problem: Dysphagia  Goal: Dysphagia will improve  Outcome: Met     Problem: Risk for Aspiration  Goal: Patient's risk for aspiration will be absent or decrease  Outcome: Met     Problem: Nutrition  Goal: Patient's nutritional and fluid intake will be adequate or improve  Outcome: Not Progressing  Goal: Enteral nutrition will be maintained or improve  Outcome: Not Progressing  Goal: Enteral nutrition will be maintained or improve  Outcome: Not Progressing     Problem:  Urinary Elimination  Goal: Establish and maintain regular urinary output  Outcome: Progressing     Problem: Bowel Elimination  Goal: Establish and maintain regular bowel function  Outcome: Progressing     Problem: Gastrointestinal Irritability  Goal: Nausea and vomiting will be absent or improve  Outcome: Met  Goal: Diarrhea will be absent or improved  Outcome: Met     Problem: Self Care  Goal: Patient will have the ability to perform ADLs independently or with assistance (bathe, groom, dress, toilet and feed)  Outcome: Progressing     Problem: Infection - Standard  Goal: Patient will remain free from infection  Outcome: Progressing   The patient is Stable - Low risk of patient condition declining or worsening    Shift Goals  Clinical Goals: pain control, OR today  Patient Goals: pain control, OR  Family Goals: N/A    Progress made toward(s) clinical / shift goals:  See RN progress note    Patient is not progressing towards the following goals: N/A      Problem: Nutrition  Goal: Patient's nutritional and fluid intake will be adequate or improve  Outcome: Not Progressing  Goal: Enteral nutrition will be maintained or improve  Outcome: Not Progressing  Goal: Enteral nutrition will be maintained or improve  Outcome: Not Progressing    Madai Marti R.N.

## 2022-06-23 NOTE — ANESTHESIA PREPROCEDURE EVALUATION
Case: [id number] Date/Time: 06/23/22 1055    Procedures:       LAPAROTOMY, EXPLORATORY      DEBULKING, MASS      SALPINGO-OOPHORECTOMY, BILATERAL, LAPAROSCOPIC (Bilateral )      HYSTERECTOMY, TOTAL, ABDOMINAL      LYMPHADENECTOMY    Location: [place] [address] / SURGERY [facility]    Surgeons: Remi Gaspar M.D.          Relevant Problems   No relevant active problems       Physical Exam    Airway   Mallampati: II  TM distance: >3 FB  Neck ROM: full       Cardiovascular - normal exam  Rhythm: regular  Rate: normal  (-) murmur     Dental - normal exam           Pulmonary - normal exam  Breath sounds clear to auscultation     Abdominal    Neurological - normal exam                 Anesthesia Plan    ASA 3   ASA physical status 3 criteria: morbid obesity - BMI greater than or equal to 40    Plan - general       Airway plan will be ETT          Induction: intravenous    Postoperative Plan: Postoperative administration of opioids is intended.    Pertinent diagnostic labs and testing reviewed    Informed Consent:    Anesthetic plan and risks discussed with patient.    Use of blood products discussed with: patient whom consented to blood products.

## 2022-06-23 NOTE — CARE PLAN
Problem: Pain - Standard  Goal: Alleviation of pain or a reduction in pain to the patient’s comfort goal  Outcome: Progressing     Problem: Knowledge Deficit - Standard  Goal: Patient and family/care givers will demonstrate understanding of plan of care, disease process/condition, diagnostic tests and medications  Outcome: Progressing     The patient is Stable - Low risk of patient condition declining or worsening    Shift Goals  Clinical Goals: Surgery  Patient Goals: pain control    Progress made toward(s) clinical / shift goals:  POC reviewed with pt, pt verbalizes understanding. Pt reports effective pain relief with ordered PRN medication.     Pt had multiple bowel movements after consuming bowel prep. Stool sample collected and sent for processing. Occult stool resulted NEG    Patient is not progressing towards the following goals:

## 2022-06-23 NOTE — OP REPORT
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     Anesthesiologist/Type of Anesthesia:  Anesthesiologist: Wilton Will D.O./General     Surgical Staff:  Assistant: HERMINIA Molina  Circulator: Raquel Benedict R.N.  Relief Circulator: Kelsey Cabrera R.N.  Scrub Person: Maame Gerard     Specimens removed if any:  ID Type Source Tests Collected by Time Destination   A : pelvic fluid Other Other PATHOLOGY SPECIMEN Remi Gaspar M.D. 6/23/2022 12:36 PM     B : left fallopian tube and ovary Tissue Ovary PATHOLOGY SPECIMEN Remi Gaspar M.D. 6/23/2022 12:51 PM     C : left adnexa Other Other PATHOLOGY SPECIMEN Remi Gaspar M.D. 6/23/2022  1:00 PM     D : Omentum Other Other PATHOLOGY SPECIMEN Remi Gaspar M.D. 6/23/2022  1:23 PM     E : appendix Tissue Appendix PATHOLOGY SPECIMEN Remi Gaspar M.D. 6/23/2022  1:34 PM           Estimated Blood Loss: 50 cc     Findings: Approximately a liter of ascites was noted with gelatinous material.  This is aggravated ruptured.  Upon entry.  The fluid was clinically consistent with mucinous gelatinous material.     Normal right and left diaphragm liver capsule smooth stomach appeared grossly normal abdominal peritoneal surfaces were unremarkable omentum appeared grossly normal the spleen was unremarkable.  The paracolic gutter peritoneum was free of seeding.  Gerota's fascia bilaterally were unremarkable.  Small bowel was inspected from ligament of Treitz to the ileocecal valve there was no evidence of mesenteric or serosal  disease.  The omentum and Bend appear grossly normal.     The pelvis uterus is of normal size the right tube and ovary was unremarkable however the left ovary was approximately 30 cm about the size of a beach ball.  The cyst had already ruptured with gelatinous material extruding out.  The appendix appeared grossly normal.  There was no seeding along the bladder pelvic and cul-de-sac peritoneum.     Retroperitoneally pelvic and periaortic lymph nodes were palpated to be grossly normal.     Complications: none     Indications:Ms. Moyer female with a BMI of is a 48 who presented to the emergency room with abdominal pain.  She is found to have an enlarged abdominal pelvic mass nearly 30 cm.   is 134 with a CEA of 94.  This was highly suspicious for ovarian malignancy specifically a mucinous cystadenocarcinoma.  Patient was recommended to undergo a surgical pathological evaluation.  The reason why we remove the omentum was that the cyst had ruptured.  I ended up removing the omentum mainly in the event that if this is upgraded to advanced ovarian cancer, patient would have required omentectomy.  Coming back for removal of the omentum would require a big exploratory laparotomy.  In face of patient with BMI of 48 I felt that I did not want to subject her to another exploration.  In the event that if she requires a hysterectomy or the normal ovary to be removed we will can perform via minimally invasive approach in the future.  Thus I elected not to remove the uterus or the ovary.    Procedure: After achieving adequate anesthesia patient prepped and draped in place modified dorsolithotomy position.  Midline incision was made from suprapubic bone well above the umbilicus.  Abdominal cavity was entered in the usual standard fashion without injuring the bowel.  After entering the abdomen detailed exploration was undertaken findings as noted above.  I then delivered the enlarged abdominal pelvic mass out of the  abdominal cavity.  The left posterior broad ligament was incised.  The left ureter was identified.  The left ovarian vessels were skeletonized isolated clamped divided with vessel sealer.  After completion of this the left utero-ovarian ligament was subsequently skeletonized and isolated separately divided with the vessel sealer.  The ovarian pedicle was then stick tied with 0 Vicryl suture x2 with excellent hemostasis achieved.  Left adnexectomy was completed and sent for pathological analysis.  While waiting for the frozen section a complete omentectomy was performed.    My initial focus returned towards the omentectomy and the right colon. We initially mobilized the infracolic omentum. The infracolic omentum was taken off the transverse colon with a combination of electrocautery, sharp dissection, dissecting the omental caking off the transverse colon without compromising the bowel. The lesser sac was entered. A plane was developed between the mesocolon and the gastrocolic omentum. We then divided the gastro-omental vessels along the greater curvature of the stomach with the LigaSure ensuring hemoseal then removing the entire omentum. Great care was undertaken to not compromise the inferior pole of the spleen and the tail of the pancrease was identified and these pertinent structures were not compromised.     At the completion of this I did perform an appendectomy.  The appendix was then suspended.  The mesoappendix was skeletonized and isolated separately divided with the LigaSure.  The base of the appendix was then clamped with Debbi clamp the appendix was then amputated.  The appendiceal stump was then tied with 0 Vicryl suture at the tip was cauterized.  Pursestring 3-0 silk suture was then used to dunk the appendiceal stump.  At the completion of this I did copiously irrigated the entire abdomen pelvis with 13 L of fluid.  At the completion distal bowel was placed back to his normal position with counts of  sponges needles and instrument counts once this was accounted for we then placed Seprafilm in the pelvis and in the anterior abdominal wall to minimize risk scarring and adhesions.  After completion of this the fascia was then subsequently closed with #2 Vicryl suture in a mass closure fashion subcutaneous fat was reapproximated with 2-0 Vicryl suture the skin was reapproximated with 3-0 Monocryl sutures after copiously irrigated the subcutaneous tissue.    Excellent closure was obtained.  Patient tolerated procedure well without any difficulties was extubated and transferred to the PACU in stable condition.    Intraoperative frozen section again revealed that it was a mucinous cyst tumor there was no obvious invasion or borderline features thus we did not proceed with surgical staging or removal of the contralateral ovary or the fallopian tubes or staging.

## 2022-06-24 LAB
ANION GAP SERPL CALC-SCNC: 11 MMOL/L (ref 7–16)
BASOPHILS # BLD AUTO: 0.1 % (ref 0–1.8)
BASOPHILS # BLD: 0.01 K/UL (ref 0–0.12)
BUN SERPL-MCNC: 9 MG/DL (ref 8–22)
CALCIUM SERPL-MCNC: 7.8 MG/DL (ref 8.5–10.5)
CHLORIDE SERPL-SCNC: 101 MMOL/L (ref 96–112)
CO2 SERPL-SCNC: 22 MMOL/L (ref 20–33)
CREAT SERPL-MCNC: 0.62 MG/DL (ref 0.5–1.4)
EOSINOPHIL # BLD AUTO: 0 K/UL (ref 0–0.51)
EOSINOPHIL NFR BLD: 0 % (ref 0–6.9)
ERYTHROCYTE [DISTWIDTH] IN BLOOD BY AUTOMATED COUNT: 43.9 FL (ref 35.9–50)
GFR SERPLBLD CREATININE-BSD FMLA CKD-EPI: 119 ML/MIN/1.73 M 2
GLUCOSE SERPL-MCNC: 93 MG/DL (ref 65–99)
HCT VFR BLD AUTO: 35.4 % (ref 37–47)
HGB BLD-MCNC: 11.4 G/DL (ref 12–16)
IMM GRANULOCYTES # BLD AUTO: 0.02 K/UL (ref 0–0.11)
IMM GRANULOCYTES NFR BLD AUTO: 0.3 % (ref 0–0.9)
LYMPHOCYTES # BLD AUTO: 0.95 K/UL (ref 1–4.8)
LYMPHOCYTES NFR BLD: 13.6 % (ref 22–41)
MCH RBC QN AUTO: 27.7 PG (ref 27–33)
MCHC RBC AUTO-ENTMCNC: 32.2 G/DL (ref 33.6–35)
MCV RBC AUTO: 86.1 FL (ref 81.4–97.8)
MONOCYTES # BLD AUTO: 0.58 K/UL (ref 0–0.85)
MONOCYTES NFR BLD AUTO: 8.3 % (ref 0–13.4)
NEUTROPHILS # BLD AUTO: 5.4 K/UL (ref 2–7.15)
NEUTROPHILS NFR BLD: 77.7 % (ref 44–72)
NRBC # BLD AUTO: 0 K/UL
NRBC BLD-RTO: 0 /100 WBC
PLATELET # BLD AUTO: 447 K/UL (ref 164–446)
PMV BLD AUTO: 8.7 FL (ref 9–12.9)
POTASSIUM SERPL-SCNC: 4.6 MMOL/L (ref 3.6–5.5)
RBC # BLD AUTO: 4.11 M/UL (ref 4.2–5.4)
SODIUM SERPL-SCNC: 134 MMOL/L (ref 135–145)
WBC # BLD AUTO: 7 K/UL (ref 4.8–10.8)

## 2022-06-24 PROCEDURE — 700111 HCHG RX REV CODE 636 W/ 250 OVERRIDE (IP): Performed by: NURSE PRACTITIONER

## 2022-06-24 PROCEDURE — 770001 HCHG ROOM/CARE - MED/SURG/GYN PRIV*

## 2022-06-24 PROCEDURE — 85025 COMPLETE CBC W/AUTO DIFF WBC: CPT

## 2022-06-24 PROCEDURE — 700105 HCHG RX REV CODE 258: Performed by: SPECIALIST

## 2022-06-24 PROCEDURE — 80048 BASIC METABOLIC PNL TOTAL CA: CPT

## 2022-06-24 PROCEDURE — 36415 COLL VENOUS BLD VENIPUNCTURE: CPT

## 2022-06-24 RX ORDER — KETOROLAC TROMETHAMINE 30 MG/ML
30 INJECTION, SOLUTION INTRAMUSCULAR; INTRAVENOUS EVERY 6 HOURS PRN
Status: DISCONTINUED | OUTPATIENT
Start: 2022-06-24 | End: 2022-06-25

## 2022-06-24 RX ADMIN — SODIUM CHLORIDE, POTASSIUM CHLORIDE, SODIUM LACTATE AND CALCIUM CHLORIDE: 600; 310; 30; 20 INJECTION, SOLUTION INTRAVENOUS at 05:46

## 2022-06-24 RX ADMIN — Medication: at 14:33

## 2022-06-24 RX ADMIN — KETOROLAC TROMETHAMINE 30 MG: 30 INJECTION, SOLUTION INTRAMUSCULAR; INTRAVENOUS at 14:23

## 2022-06-24 RX ADMIN — KETOROLAC TROMETHAMINE 30 MG: 30 INJECTION, SOLUTION INTRAMUSCULAR; INTRAVENOUS at 22:34

## 2022-06-24 ASSESSMENT — ENCOUNTER SYMPTOMS
VOMITING: 0
FEVER: 0
SHORTNESS OF BREATH: 0
DIZZINESS: 0
ABDOMINAL PAIN: 1
NAUSEA: 0
COUGH: 0
CHILLS: 0

## 2022-06-24 ASSESSMENT — PAIN DESCRIPTION - PAIN TYPE
TYPE: SURGICAL PAIN

## 2022-06-24 NOTE — PROGRESS NOTES
AA&Ox4. Denies CP/SOB.  Reporting 8/10 pain. PCA pump in place.   Educated patient regarding pharmacologic and non pharmacologic modalities for pain management.  Skin per flowsheet. Dressing CDI.   Tolerating clears diet. Denies N/V.  Rees removed at 6am. Patient needs to void by noon. Last BM 6/23.  Pt ambulates stand by assist.  All needs met at this time. Call light within reach. Pt calls appropriately. Bed low and locked, non skid socks in place. Hourly rounding in place.

## 2022-06-24 NOTE — PROGRESS NOTES
GYN/Oncology Progress Note               Author: HERMINIA Molina Date & Time created: 6/24/2022  8:46 AM     Interval History:  Patient doing well, has not gotten out of bed yet. Pain controlled with PCA, but has been locking out at times. Denies any nausea at this time.     Review of Systems:  Review of Systems   Constitutional: Negative for chills and fever.   Respiratory: Negative for cough and shortness of breath.    Cardiovascular: Negative for chest pain and leg swelling.   Gastrointestinal: Positive for abdominal pain. Negative for nausea and vomiting.   Genitourinary: Negative for dysuria and urgency.   Neurological: Negative for dizziness.       Physical Exam:  Physical Exam  Constitutional:       Appearance: Normal appearance.   Cardiovascular:      Pulses: Normal pulses.   Pulmonary:      Effort: Pulmonary effort is normal.   Abdominal:      Palpations: Abdomen is soft.      Tenderness: There is abdominal tenderness.      Comments: Midline dressing CDI   Skin:     General: Skin is warm and dry.      Capillary Refill: Capillary refill takes 2 to 3 seconds.   Neurological:      Mental Status: She is alert and oriented to person, place, and time.         Labs:          Recent Labs     06/22/22 0305 06/24/22 0226   SODIUM 138 134*   POTASSIUM 4.0 4.6   CHLORIDE 105 101   CO2 24 22   BUN 12 9   CREATININE 0.79 0.62   CALCIUM 8.7 7.8*     Recent Labs     06/22/22 0305 06/24/22 0226   GLUCOSE 87 93     Recent Labs     06/22/22 0305 06/24/22 0226   RBC 3.98* 4.11*   HEMOGLOBIN 11.1* 11.4*   HEMATOCRIT 35.3* 35.4*   PLATELETCT 420 447*   PROTHROMBTM 14.5  --    APTT 34.3  --    INR 1.17*  --      Recent Labs     06/22/22 0305 06/24/22 0226   WBC 5.4 7.0   NEUTSPOLYS 55.70 77.70*   LYMPHOCYTES 30.30 13.60*   MONOCYTES 9.10 8.30   EOSINOPHILS 4.30 0.00   BASOPHILS 0.40 0.10     Recent Labs     06/22/22 0305 06/24/22 0226   SODIUM 138 134*   POTASSIUM 4.0 4.6   CHLORIDE 105 101   CO2 24 22    GLUCOSE 87 93   BUN 12 9   CREATININE 0.79 0.62   CALCIUM 8.7 7.8*     Hemodynamics:  Temp (24hrs), Av.9 °C (98.4 °F), Min:36.2 °C (97.1 °F), Max:37.6 °C (99.6 °F)  Temperature: 36.2 °C (97.1 °F)  Pulse  Av.3  Min: 52  Max: 115   Blood Pressure: 119/63     Respiratory:    Respiration: 19, Pulse Oximetry: 97 %     Work Of Breathing / Effort: Shallow     Fluids:  No intake or output data in the 24 hours ending 22 0827    GI/Nutrition:  Orders Placed This Encounter   Procedures   • Diet Order Diet: Clear Liquid     Standing Status:   Standing     Number of Occurrences:   1     Order Specific Question:   Diet:     Answer:   Clear Liquid [10]     Medical Decision Making, by Problem:  Active Hospital Problems    Diagnosis    • *Pelvic mass [R19.00]    • Abdominal pain [R10.9]        Plan:  37-year-old female presents with 30 cm mass with elevated  of 134 with a CEA of 94.3.      1. POD #1: s/p ex lap left oophorectomy/salpingectomy/appendectomy/omentectomy on 22. Final pathology pending. Intraop findings discussed with patient. Advance diet as tolerated, encourage ambulation and IS  2. Post operative pain: well controlled, dilaudid PCA, add toradol PRN, once taking PO will transition off PCA to PO pain medication  3. Dispo: continue inpatient post operative care.     Case discussed with Dr. Gaspar.     Quality-Core Measures

## 2022-06-24 NOTE — CARE PLAN
Problem: Pain - Standard  Goal: Alleviation of pain or a reduction in pain to the patient’s comfort goal  Outcome: Progressing     Problem: Knowledge Deficit - Standard  Goal: Patient and family/care givers will demonstrate understanding of plan of care, disease process/condition, diagnostic tests and medications  Outcome: Progressing     The patient is Stable - Low risk of patient condition declining or worsening    Shift Goals  Clinical Goals: pain control  Patient Goals: rest  Family Goals: N/A    Progress made toward(s) clinical / shift goals:  Pain controlled, Dilaudid PCA in place, resting comfortably    Patient is not progressing towards the following goals:

## 2022-06-24 NOTE — CARE PLAN
Problem: Pain - Standard  Goal: Alleviation of pain or a reduction in pain to the patient’s comfort goal  Outcome: Progressing     Problem: Knowledge Deficit - Standard  Goal: Patient and family/care givers will demonstrate understanding of plan of care, disease process/condition, diagnostic tests and medications  Outcome: Progressing       The patient is Stable - Low risk of patient condition declining or worsening    Shift Goals  Clinical Goals: pain control; void  Patient Goals: pain control; void  Family Goals: N/A    Progress made toward(s) clinical / shift goals:  Pain controlled with PCA. Patient voiding.    Patient is not progressing towards the following goals:

## 2022-06-25 PROCEDURE — A9270 NON-COVERED ITEM OR SERVICE: HCPCS | Performed by: NURSE PRACTITIONER

## 2022-06-25 PROCEDURE — 770001 HCHG ROOM/CARE - MED/SURG/GYN PRIV*

## 2022-06-25 PROCEDURE — 700102 HCHG RX REV CODE 250 W/ 637 OVERRIDE(OP): Performed by: NURSE PRACTITIONER

## 2022-06-25 PROCEDURE — 700111 HCHG RX REV CODE 636 W/ 250 OVERRIDE (IP): Performed by: NURSE PRACTITIONER

## 2022-06-25 RX ORDER — KETOROLAC TROMETHAMINE 30 MG/ML
30 INJECTION, SOLUTION INTRAMUSCULAR; INTRAVENOUS EVERY 6 HOURS
Status: COMPLETED | OUTPATIENT
Start: 2022-06-25 | End: 2022-06-26

## 2022-06-25 RX ORDER — HYDROCODONE BITARTRATE AND ACETAMINOPHEN 10; 325 MG/1; MG/1
1 TABLET ORAL EVERY 4 HOURS PRN
Status: DISCONTINUED | OUTPATIENT
Start: 2022-06-25 | End: 2022-06-27 | Stop reason: HOSPADM

## 2022-06-25 RX ORDER — HYDROMORPHONE HYDROCHLORIDE 1 MG/ML
0.5 INJECTION, SOLUTION INTRAMUSCULAR; INTRAVENOUS; SUBCUTANEOUS EVERY 4 HOURS PRN
Status: DISCONTINUED | OUTPATIENT
Start: 2022-06-25 | End: 2022-06-27 | Stop reason: HOSPADM

## 2022-06-25 RX ADMIN — KETOROLAC TROMETHAMINE 30 MG: 30 INJECTION, SOLUTION INTRAMUSCULAR; INTRAVENOUS at 21:28

## 2022-06-25 RX ADMIN — HYDROCODONE BITARTRATE AND ACETAMINOPHEN 1 TABLET: 10; 325 TABLET ORAL at 15:57

## 2022-06-25 RX ADMIN — KETOROLAC TROMETHAMINE 30 MG: 30 INJECTION, SOLUTION INTRAMUSCULAR; INTRAVENOUS at 15:58

## 2022-06-25 RX ADMIN — KETOROLAC TROMETHAMINE 30 MG: 30 INJECTION, SOLUTION INTRAMUSCULAR; INTRAVENOUS at 09:15

## 2022-06-25 RX ADMIN — HYDROCODONE BITARTRATE AND ACETAMINOPHEN 1 TABLET: 10; 325 TABLET ORAL at 11:19

## 2022-06-25 RX ADMIN — HYDROCODONE BITARTRATE AND ACETAMINOPHEN 1 TABLET: 10; 325 TABLET ORAL at 20:23

## 2022-06-25 ASSESSMENT — ENCOUNTER SYMPTOMS
DIZZINESS: 0
CHILLS: 0
NAUSEA: 0
SHORTNESS OF BREATH: 0
ABDOMINAL PAIN: 1
COUGH: 0
FEVER: 0
VOMITING: 0

## 2022-06-25 ASSESSMENT — PAIN DESCRIPTION - PAIN TYPE
TYPE: SURGICAL PAIN

## 2022-06-25 NOTE — CARE PLAN
The patient is Stable - Low risk of patient condition declining or worsening    Shift Goals  Clinical Goals: pain control, OOB  Patient Goals: pain control  Family Goals: N/A    Progress made toward(s) clinical / shift goals:  pain controlled, PCA discontinued    Patient is not progressing towards the following goals:      Problem: Pain - Standard  Goal: Alleviation of pain or a reduction in pain to the patient’s comfort goal  Outcome: Progressing     Problem: Knowledge Deficit - Standard  Goal: Patient and family/care givers will demonstrate understanding of plan of care, disease process/condition, diagnostic tests and medications  Outcome: Progressing     Problem: Respiratory  Goal: Patient will achieve/maintain optimum respiratory ventilation and gas exchange  Outcome: Progressing     Problem: Urinary Elimination  Goal: Establish and maintain regular urinary output  Outcome: Progressing     Problem: Bowel Elimination  Goal: Establish and maintain regular bowel function  Outcome: Progressing     Problem: Mobility  Goal: Patient's capacity to carry out activities will improve  Outcome: Progressing

## 2022-06-25 NOTE — CARE PLAN
The patient is Stable - Low risk of patient condition declining or worsening    Shift Goals  Clinical Goals: pain control, rest  Patient Goals: pain control, sleep  Family Goals: N/A    Progress made toward(s) clinical / shift goals:    Problem: Pain - Standard  Goal: Alleviation of pain or a reduction in pain to the patient’s comfort goal  Outcome: Progressing     Problem: Knowledge Deficit - Standard  Goal: Patient and family/care givers will demonstrate understanding of plan of care, disease process/condition, diagnostic tests and medications  Outcome: Progressing     Patient able to verbalize understanding of plan of care. Patient's pain being controlled with bolus button and as ordered in MAR.

## 2022-06-25 NOTE — PROGRESS NOTES
GYN/Oncology Progress Note               Author: HERMINIA Molina Date & Time created: 2022  10:28 AM     Interval History:  Patient doing well, ambulated to bathroom. Pain controlled with PCA and toradol. Denies any nausea at this time. Tolerating diet    Review of Systems:  Review of Systems   Constitutional: Negative for chills and fever.   Respiratory: Negative for cough and shortness of breath.    Cardiovascular: Negative for chest pain and leg swelling.   Gastrointestinal: Positive for abdominal pain. Negative for nausea and vomiting.   Genitourinary: Negative for dysuria and urgency.   Neurological: Negative for dizziness.       Physical Exam:  Physical Exam  Constitutional:       Appearance: Normal appearance.   Cardiovascular:      Pulses: Normal pulses.   Pulmonary:      Effort: Pulmonary effort is normal.   Abdominal:      Palpations: Abdomen is soft.      Tenderness: There is abdominal tenderness.      Comments: Midline incision well approximated, no s/s of infection   Skin:     General: Skin is warm and dry.      Capillary Refill: Capillary refill takes 2 to 3 seconds.   Neurological:      Mental Status: She is alert and oriented to person, place, and time.         Labs:          Recent Labs     22   SODIUM 134*   POTASSIUM 4.6   CHLORIDE 101   CO2 22   BUN 9   CREATININE 0.62   CALCIUM 7.8*     Recent Labs     22   GLUCOSE 93     Recent Labs     22   RBC 4.11*   HEMOGLOBIN 11.4*   HEMATOCRIT 35.4*   PLATELETCT 447*     Recent Labs     22   WBC 7.0   NEUTSPOLYS 77.70*   LYMPHOCYTES 13.60*   MONOCYTES 8.30   EOSINOPHILS 0.00   BASOPHILS 0.10     Recent Labs     22   SODIUM 134*   POTASSIUM 4.6   CHLORIDE 101   CO2 22   GLUCOSE 93   BUN 9   CREATININE 0.62   CALCIUM 7.8*     Hemodynamics:  Temp (24hrs), Av.7 °C (98.1 °F), Min:36.6 °C (97.9 °F), Max:36.8 °C (98.3 °F)  Temperature: 36.7 °C (98 °F)  Pulse  Av.4  Min: 52  Max:  115   Blood Pressure: 120/72     Respiratory:    Respiration: 17, Pulse Oximetry: 94 %        RUL Breath Sounds: Clear, RML Breath Sounds: Diminished, RLL Breath Sounds: Diminished, SHAWNA Breath Sounds: Clear, LLL Breath Sounds: Diminished  Fluids:  No intake or output data in the 24 hours ending 06/21/22 0827    GI/Nutrition:  Orders Placed This Encounter   Procedures   • Diet Order Diet: Low Fiber(GI Soft)     Standing Status:   Standing     Number of Occurrences:   1     Order Specific Question:   Diet:     Answer:   Low Fiber(GI Soft) [2]     Medical Decision Making, by Problem:  Active Hospital Problems    Diagnosis    • *Pelvic mass [R19.00]    • Abdominal pain [R10.9]        Plan:  37-year-old female presents with 30 cm mass with elevated  of 134 with a CEA of 94.3.      1. POD #2: s/p ex lap left oophorectomy/salpingectomy/appendectomy/omentectomy on 6/23/22. Final pathology pending. Intraop findings discussed with patient. Tolerating regular diet, encourage ambulation and IS  2. Post operative pain: well controlled, dilaudid PCA, add toradol PRN, stop PCA and start Norco with IV dilaudid for BTP, continue toradol now scheduled.  Abdominal binder  3. Dispo: continue inpatient post operative care. D/c home once ambulating, pain well controlled on Norco and tolerating diet    Case discussed with Dr. Gaspar.     Quality-Core Measures

## 2022-06-25 NOTE — PROGRESS NOTES
Assumed care of patient at 0700. Patient is alert and oriented, respirations are unlabored and regular on 0.5L 02. Lung sounds clear throughout, diminished in bases. Abdomen soft, tender, midline island dressing, slight old drainage near head portion of dressing otherwise clean and no drainage. Hypoactive bowel sounds, not passing gas. Voiding without difficulty. Patient has ambulated to bathroom a few times. Pain stated at 8, appropriate pain medication administered and educated on use of PCA bolus button. Bed in lowest position, call light within reach, patient states no needs at this time.

## 2022-06-25 NOTE — PROGRESS NOTES
Report received from RN, assumed care at 1845  Pt is A0X4, and responds appropriately   Pt declines any SOB, chest pain, new onset of numbness/ tingiling  Pt rates pain at 6/10, on a scale of 1-10, pt medicated per MAR  Pt is voiding adequatly and without hesitancy  Pt has hypoactive  bowel sounds, last BM on 6/23  Pt ambulates with standby assist   Pt is tolerating a low fiber GI soft diet, pt denies any nausea/vomiting  Patient is pleasant and resting in bed watching a television show.  Plan of care discussed, all questions answered. Explained importance of calling before getting OOB and pt verbalizes understanding.  Call light is within reach, treaded slipper socks on, bed in lowest/ locked position, hourly rounding in place, all needs met at this time

## 2022-06-26 PROCEDURE — 700105 HCHG RX REV CODE 258: Performed by: SPECIALIST

## 2022-06-26 PROCEDURE — 700102 HCHG RX REV CODE 250 W/ 637 OVERRIDE(OP): Performed by: NURSE PRACTITIONER

## 2022-06-26 PROCEDURE — A9270 NON-COVERED ITEM OR SERVICE: HCPCS | Performed by: NURSE PRACTITIONER

## 2022-06-26 PROCEDURE — 700111 HCHG RX REV CODE 636 W/ 250 OVERRIDE (IP): Performed by: NURSE PRACTITIONER

## 2022-06-26 PROCEDURE — 770001 HCHG ROOM/CARE - MED/SURG/GYN PRIV*

## 2022-06-26 RX ORDER — KETOROLAC TROMETHAMINE 10 MG/1
10 TABLET, FILM COATED ORAL EVERY 6 HOURS
Status: DISCONTINUED | OUTPATIENT
Start: 2022-06-26 | End: 2022-06-27 | Stop reason: HOSPADM

## 2022-06-26 RX ADMIN — HYDROCODONE BITARTRATE AND ACETAMINOPHEN 1 TABLET: 10; 325 TABLET ORAL at 04:28

## 2022-06-26 RX ADMIN — KETOROLAC TROMETHAMINE 10 MG: 10 TABLET, FILM COATED ORAL at 09:40

## 2022-06-26 RX ADMIN — SODIUM CHLORIDE, POTASSIUM CHLORIDE, SODIUM LACTATE AND CALCIUM CHLORIDE: 600; 310; 30; 20 INJECTION, SOLUTION INTRAVENOUS at 04:28

## 2022-06-26 RX ADMIN — HYDROCODONE BITARTRATE AND ACETAMINOPHEN 1 TABLET: 10; 325 TABLET ORAL at 14:06

## 2022-06-26 RX ADMIN — HYDROCODONE BITARTRATE AND ACETAMINOPHEN 1 TABLET: 10; 325 TABLET ORAL at 18:32

## 2022-06-26 RX ADMIN — KETOROLAC TROMETHAMINE 10 MG: 10 TABLET, FILM COATED ORAL at 22:13

## 2022-06-26 RX ADMIN — HYDROCODONE BITARTRATE AND ACETAMINOPHEN 1 TABLET: 10; 325 TABLET ORAL at 09:40

## 2022-06-26 RX ADMIN — KETOROLAC TROMETHAMINE 30 MG: 30 INJECTION, SOLUTION INTRAMUSCULAR; INTRAVENOUS at 03:21

## 2022-06-26 RX ADMIN — KETOROLAC TROMETHAMINE 10 MG: 10 TABLET, FILM COATED ORAL at 16:12

## 2022-06-26 ASSESSMENT — ENCOUNTER SYMPTOMS
NAUSEA: 0
DIZZINESS: 0
VOMITING: 0
CHILLS: 0
FEVER: 0
ABDOMINAL PAIN: 1
COUGH: 0
SHORTNESS OF BREATH: 0

## 2022-06-26 ASSESSMENT — PAIN DESCRIPTION - PAIN TYPE
TYPE: SURGICAL PAIN
TYPE: ACUTE PAIN;SURGICAL PAIN
TYPE: SURGICAL PAIN

## 2022-06-26 NOTE — PROGRESS NOTES
GYN/Oncology Progress Note               Author: HERMINIA Molina Date & Time created: 2022  9:07 AM     Interval History:  Patient doing well, ambulated in marquez x1, BM X2. Pain controlled with noroc and toradol. Denies any nausea at this time. Tolerating diet    Review of Systems:  Review of Systems   Constitutional: Negative for chills and fever.   Respiratory: Negative for cough and shortness of breath.    Cardiovascular: Negative for chest pain and leg swelling.   Gastrointestinal: Positive for abdominal pain. Negative for nausea and vomiting.   Genitourinary: Negative for dysuria and urgency.   Neurological: Negative for dizziness.       Physical Exam:  Physical Exam  Constitutional:       Appearance: Normal appearance.   Cardiovascular:      Pulses: Normal pulses.   Pulmonary:      Effort: Pulmonary effort is normal.   Abdominal:      Palpations: Abdomen is soft.      Tenderness: There is abdominal tenderness.      Comments: Midline incision well approximated, no s/s of infection   Skin:     General: Skin is warm and dry.      Capillary Refill: Capillary refill takes 2 to 3 seconds.   Neurological:      Mental Status: She is alert and oriented to person, place, and time.         Labs:          Recent Labs     22   SODIUM 134*   POTASSIUM 4.6   CHLORIDE 101   CO2 22   BUN 9   CREATININE 0.62   CALCIUM 7.8*     Recent Labs     22   GLUCOSE 93     Recent Labs     22   RBC 4.11*   HEMOGLOBIN 11.4*   HEMATOCRIT 35.4*   PLATELETCT 447*     Recent Labs     22   WBC 7.0   NEUTSPOLYS 77.70*   LYMPHOCYTES 13.60*   MONOCYTES 8.30   EOSINOPHILS 0.00   BASOPHILS 0.10     Recent Labs     22   SODIUM 134*   POTASSIUM 4.6   CHLORIDE 101   CO2 22   GLUCOSE 93   BUN 9   CREATININE 0.62   CALCIUM 7.8*     Hemodynamics:  Temp (24hrs), Av.7 °C (98 °F), Min:36.6 °C (97.8 °F), Max:36.8 °C (98.3 °F)  Temperature: 36.6 °C (97.8 °F)  Pulse  Av.8  Min: 52   Max: 115   Blood Pressure: 128/83     Respiratory:    Respiration: 17, Pulse Oximetry: 95 %        RUL Breath Sounds: Clear, RML Breath Sounds: Diminished, RLL Breath Sounds: Diminished, SHAWNA Breath Sounds: Clear, LLL Breath Sounds: Diminished  Fluids:  No intake or output data in the 24 hours ending 06/21/22 0827    GI/Nutrition:  Orders Placed This Encounter   Procedures   • Diet Order Diet: Low Fiber(GI Soft)     Standing Status:   Standing     Number of Occurrences:   1     Order Specific Question:   Diet:     Answer:   Low Fiber(GI Soft) [2]     Medical Decision Making, by Problem:  Active Hospital Problems    Diagnosis    • *Pelvic mass [R19.00]    • Abdominal pain [R10.9]        Plan:  37-year-old female presents with 30 cm mass with elevated  of 134 with a CEA of 94.3.      1. POD #3: s/p ex lap left oophorectomy/salpingectomy/appendectomy/omentectomy on 6/23/22. Final pathology pending. Intraop findings discussed with patient. Tolerating regular diet, encourage ambulation and IS, BM x2  2. Post operative pain: well controlled, with Norco and IV toradol. Transition toradol to PO.  Abdominal binder  3. Dispo: continue inpatient post operative care. D/c home once ambulating, pain well controlled on Norco and tolerating diet. Possible dc home this afternoon if pain still controlled with PO toradol    Case discussed with Dr. Gaspar.     Quality-Core Measures

## 2022-06-26 NOTE — PROGRESS NOTES
2 abdominal binders delivered bedside. If any issue or questions, please contact ortho tech team.

## 2022-06-26 NOTE — CARE PLAN
The patient is Stable - Low risk of patient condition declining or worsening    Shift Goals  Clinical Goals: pain control, sleep  Patient Goals: pain control  Family Goals: N/A    Progress made toward(s) clinical / shift goals:    Problem: Knowledge Deficit - Standard  Goal: Patient and family/care givers will demonstrate understanding of plan of care, disease process/condition, diagnostic tests and medications  6/26/2022 0247 by Sherry Strange R.N.  Outcome: Progressing  6/26/2022 0244 by Sherry Strange R.N.  Outcome: Progressing     Problem: Pain - Standard  Goal: Alleviation of pain or a reduction in pain to the patient’s comfort goal  6/26/2022 0247 by Sherry Strange R.N.  Outcome: Progressing  6/26/2022 0244 by Sherry Strange R.N.  Outcome: Progressing

## 2022-06-26 NOTE — CARE PLAN
The patient is Stable - Low risk of patient condition declining or worsening    Shift Goals  Clinical Goals: pain control, sleep  Patient Goals: pain control  Family Goals: N/A    Progress made toward(s) clinical / shift goals:    Problem: Pain - Standard  Goal: Alleviation of pain or a reduction in pain to the patient’s comfort goal  Outcome: Progressing     Problem: Knowledge Deficit - Standard  Goal: Patient and family/care givers will demonstrate understanding of plan of care, disease process/condition, diagnostic tests and medications  Outcome: Progressing       Patient is able to verbalize understanding of plan of care. Patient being medicated for pain per MAR.

## 2022-06-26 NOTE — PROGRESS NOTES
Bedside report received.  Assessment complete.  A&O x 4. Patient calls appropriately.  Patient ambulates with SBA assist.    Patient has 9/10 pain. Pain managed with prescribed medications.  Denies N&V. Tolerating GI soft diet.  MLI with dermabond, approximated, MITCHEL.   + void, + flatus, + BM.  Patient denies SOB.  SCD's on.    Review plan with of care with patient. Call light and personal belongings within reach. Hourly rounding in place. All needs met at this time.

## 2022-06-26 NOTE — CARE PLAN
The patient is Stable - Low risk of patient condition declining or worsening    Shift Goals  Clinical Goals: pain control, mobilize  Patient Goals: pain control  Family Goals: N/A    Progress made toward(s) clinical / shift goals:  assess pain Q2-4H, administer PRNs as indicated, encourage patient to voice pain to staff; patient mobilizes independently in room and hallways     Patient is not progressing towards the following goals:      Problem: Pain - Standard  Goal: Alleviation of pain or a reduction in pain to the patient’s comfort goal  Outcome: Progressing     Problem: Knowledge Deficit - Standard  Goal: Patient and family/care givers will demonstrate understanding of plan of care, disease process/condition, diagnostic tests and medications  Outcome: Progressing

## 2022-06-26 NOTE — PROGRESS NOTES
Report received from RN, assumed care at 1845  Pt is A0X4, and responds appropriately   Pt declines any SOB, chest pain, new onset of numbness/ tingiling  Pt rates pain at 7/10, on a scale of 1-10, pt medicated per MAR  Pt is voiding adequatly and without hesitancy  Pt not passing gas, hypoactive bowel sounds, last BM on 6/23  Pt ambulates with a standby assist   Pt is tolerating a low fiber GI softdiet, pt denies any nausea/vomiting at this time  Abdomen is soft, tender midline incision with staples open to air  Plan of care discussed, all questions answered. Explained importance of calling before getting OOB and pt verbalizes understanding. Explained importance of oral care. Call light is within reach, treaded slipper socks on, bed in lowest/ locked position, hourly rounding in place, all needs met at this time

## 2022-06-27 ENCOUNTER — PHARMACY VISIT (OUTPATIENT)
Dept: PHARMACY | Facility: MEDICAL CENTER | Age: 37
End: 2022-06-27
Payer: COMMERCIAL

## 2022-06-27 VITALS
HEIGHT: 66 IN | TEMPERATURE: 98.4 F | OXYGEN SATURATION: 94 % | RESPIRATION RATE: 18 BRPM | BODY MASS INDEX: 47.09 KG/M2 | SYSTOLIC BLOOD PRESSURE: 129 MMHG | HEART RATE: 71 BPM | DIASTOLIC BLOOD PRESSURE: 89 MMHG | WEIGHT: 293 LBS

## 2022-06-27 PROCEDURE — A9270 NON-COVERED ITEM OR SERVICE: HCPCS | Performed by: NURSE PRACTITIONER

## 2022-06-27 PROCEDURE — 700102 HCHG RX REV CODE 250 W/ 637 OVERRIDE(OP): Performed by: NURSE PRACTITIONER

## 2022-06-27 PROCEDURE — RXMED WILLOW AMBULATORY MEDICATION CHARGE: Performed by: NURSE PRACTITIONER

## 2022-06-27 RX ORDER — HYDROCODONE BITARTRATE AND ACETAMINOPHEN 10; 325 MG/1; MG/1
TABLET ORAL
Qty: 28 TABLET | Refills: 0 | OUTPATIENT
Start: 2022-06-27

## 2022-06-27 RX ORDER — KETOROLAC TROMETHAMINE 10 MG/1
10 TABLET, FILM COATED ORAL EVERY 6 HOURS PRN
Qty: 8 TABLET | Refills: 0 | Status: SHIPPED | OUTPATIENT
Start: 2022-06-27

## 2022-06-27 RX ORDER — HYDROCODONE BITARTRATE AND ACETAMINOPHEN 10; 325 MG/1; MG/1
1 TABLET ORAL EVERY 6 HOURS PRN
Qty: 28 TABLET | Refills: 0
Start: 2022-06-27 | End: 2022-07-04

## 2022-06-27 RX ADMIN — HYDROCODONE BITARTRATE AND ACETAMINOPHEN 1 TABLET: 10; 325 TABLET ORAL at 11:34

## 2022-06-27 RX ADMIN — KETOROLAC TROMETHAMINE 10 MG: 10 TABLET, FILM COATED ORAL at 09:37

## 2022-06-27 RX ADMIN — HYDROCODONE BITARTRATE AND ACETAMINOPHEN 1 TABLET: 10; 325 TABLET ORAL at 03:28

## 2022-06-27 RX ADMIN — KETOROLAC TROMETHAMINE 10 MG: 10 TABLET, FILM COATED ORAL at 03:28

## 2022-06-27 ASSESSMENT — PAIN DESCRIPTION - PAIN TYPE
TYPE: SURGICAL PAIN
TYPE: ACUTE PAIN;SURGICAL PAIN
TYPE: SURGICAL PAIN
TYPE: SURGICAL PAIN

## 2022-06-27 ASSESSMENT — COGNITIVE AND FUNCTIONAL STATUS - GENERAL
SUGGESTED CMS G CODE MODIFIER MOBILITY: CH
DRESSING REGULAR LOWER BODY CLOTHING: A LITTLE
DAILY ACTIVITIY SCORE: 22
MOBILITY SCORE: 24
SUGGESTED CMS G CODE MODIFIER DAILY ACTIVITY: CJ
DRESSING REGULAR UPPER BODY CLOTHING: A LITTLE

## 2022-06-27 NOTE — PROGRESS NOTES
Report received from dayshift RN, assumed care at 1900.  Assessment complete.  A&O x 4. Patient calls appropriately.  Patient ambulates with standby assist.   Patient has 6/10 pain. Denied interventions at this time.  Denies N&V. Tolerating GI soft diet.  Surgical midline incision dermabond, CD&I.  + void, + flatus, + BM.  Patient denies SOB.  SCD's on.  Patient calm, cooperative, and pleasant.  Review plan with of care with patient. Call light and personal belongings within reach. Hourly rounding in place. All needs met at this time.

## 2022-06-27 NOTE — CARE PLAN
The patient is Stable - Low risk of patient condition declining or worsening    Shift Goals  Clinical Goals: Pain control  Patient Goals: Rest  Family Goals: N/A    Progress made toward(s) clinical / shift goals:      Problem: Pain - Standard  Goal: Alleviation of pain or a reduction in pain to the patient’s comfort goal  Outcome: Progressing   Pain medication administered PRN and reassessed.     Problem: Knowledge Deficit - Standard  Goal: Patient and family/care givers will demonstrate understanding of plan of care, disease process/condition, diagnostic tests and medications  Outcome: Progressing   Educated patient on plan of care and medications.       Patient is not progressing towards the following goals:  N/a

## 2022-06-27 NOTE — CARE PLAN
Problem: Pain - Standard  Goal: Alleviation of pain or a reduction in pain to the patient’s comfort goal  Outcome: Progressing   Pain medicated per MAR   Problem: Knowledge Deficit - Standard  Goal: Patient and family/care givers will demonstrate understanding of plan of care, disease process/condition, diagnostic tests and medications  Outcome: Progressing   Patient educated on plan of care this shift   Problem: Psychosocial  Goal: Patient's level of anxiety will decrease  Outcome: Progressing  Patient denies anxiety at this time    The patient is Stable - Low risk of patient condition declining or worsening    Shift Goals  Clinical Goals: Pain Control/ Discharge  Patient Goals: Comfort  Family Goals: N/A    Progress made toward(s) clinical / shift goals:  Pain medicated per MAR, Patient to discharge     Patient is not progressing towards the following goals:

## 2022-06-27 NOTE — PROGRESS NOTES
Assumed care of patient at 0645. Bedside report received. Assessment complete.  AA&Ox4. Denies CP/SOB.  Reporting 8/10 pain. Medicated per MAR.Educated patient regarding pharmacologic and non pharmacologic modalities for pain management.  Skin per flowsheets  Tolerating regular diet. Denies N/V.  + void. Last BM 6/26  Pt ambulates independently.  All needs met at this time. Call light within reach. Pt calls appropriately. Bed low and locked, non skid socks in place. Hourly rounding in place.

## 2022-06-27 NOTE — PROGRESS NOTES
Discharging Patient home per physician order.  Discharged with relative.  Demonstrated understanding of discharge instructions, follow up appointments, home medications, prescriptions, home care for surgical wound.  Ambulating without assistance, voiding without difficulty, pain well controlled, tolerating oral medications, oxygen saturation greater than 90% , tolerating diet. Educational handouts given and discussed.  Verbalized understanding of discharge instructions and educational handouts.  Stated several reasons why to return to ED or seek medical attention. All questions answered.  Belongings and dressing supplies with patient at time of discharge.

## 2022-06-27 NOTE — DISCHARGE SUMMARY
Discharge Summary    CHIEF COMPLAINT ON ADMISSION  Chief Complaint   Patient presents with   • Abdominal Pain     Pt reports that for the last 2 months she has had worsening, intermittent, bilateral epigastric pain that is worse when she moves. She has also noticed abdominal swelling that is abnormal for her. Pt reports that she went to urgent care on Wednesday and was told that she might have a hiatal hernia, but no imaging was done. Pt was referred to a GI doctor, and had an appointment today.        Reason for Admission  Abd Pain  Large pelvic mass    Admission Date  6/20/2022    CODE STATUS  Full Code    HPI & HOSPITAL COURSE  This is a 37 y.o. female who presented for new onset of abdominal pain, she was found to have a 30 cm pelvic mass with elevated  and CEA. She was admitted and then underwent an ex lap left oophorectomy/salpingectomy/appendectomy/omentectomy on 6/23/22. Post operatively she was started on a clear liquid diet and advanced as tolerated. She was transitioned to PO pain medication as her diet was advanced. She was able to ambulate in [place] and had BM.     Therefore, she is discharged in good and stable condition to home with close outpatient follow-up.    The patient met 2-midnight criteria for an inpatient stay at the time of discharge.    Discharge Date  6/27/22    FOLLOW UP ITEMS POST DISCHARGE  1. Abdominal binder for support  2. Wean pain medication as able    DISCHARGE DIAGNOSES  1. Left ovarian mass  2. Post operative pain    FOLLOW UP  Remi Gaspar M.D.  [address]  [place] [address]  [phone]    Go on 8/1/2022  11 am      MEDICATIONS ON DISCHARGE     Medication List      START taking these medications      Instructions   * HYDROcodone/acetaminophen  MG Tabs  Commonly known as: NORCO   Take 1 Tablet by mouth every 6 hours as needed for Severe Pain for up to 7 days.  Dose: 1 Tablet     ketorolac 10 MG Tabs  Commonly known as: TORADOL   Take 1 Tablet by  mouth every 6 hours as needed for Mild Pain.  Dose: 10 mg         * This list has 1 medication(s) that are the same as other medications prescribed for you. Read the directions carefully, and ask your doctor or other care provider to review them with you.            CONTINUE taking these medications      Instructions   famotidine 20 MG Tabs  Commonly known as: PEPCID   Take 1 Tablet by mouth 2 times a day.  Dose: 20 mg     ibuprofen 200 MG Tabs  Commonly known as: MOTRIN   Take 600 mg by mouth every 6 hours as needed. Indications: Pain  Dose: 600 mg        ASK your doctor about these medications      Instructions   * HYDROcodone-acetaminophen 5-325 MG Tabs per tablet  Commonly known as: NORCO  Ask about: Should I take this medication?   Take 1 Tablet by mouth every 6 hours as needed (pain) for up to 3 days.  Dose: 1 Tablet         * This list has 1 medication(s) that are the same as other medications prescribed for you. Read the directions carefully, and ask your doctor or other care provider to review them with you.                Allergies  No Known Allergies    DIET  Orders Placed This Encounter   Procedures   • Diet Order Diet: Low Fiber(GI Soft)     Standing Status:   Standing     Number of Occurrences:   1     Order Specific Question:   Diet:     Answer:   Low Fiber(GI Soft) [2]       ACTIVITY  As tolerated.  10-lb lifting restriction    CONSULTATIONS  none    PROCEDURES  ex lap left oophorectomy/salpingectomy/appendectomy/omentectomy on 6/23/22    LABORATORY  Lab Results   Component Value Date    SODIUM 134 (L) 06/24/2022    POTASSIUM 4.6 06/24/2022    CHLORIDE 101 06/24/2022    CO2 22 06/24/2022    GLUCOSE 93 06/24/2022    BUN 9 06/24/2022    CREATININE 0.62 06/24/2022        Lab Results   Component Value Date    WBC 7.0 06/24/2022    HEMOGLOBIN 11.4 (L) 06/24/2022    HEMATOCRIT 35.4 (L) 06/24/2022    PLATELETCT 447 (H) 06/24/2022        Total time of the discharge process exceeds 15 minutes.   Xolair Pregnancy And Lactation Text: This medication is Pregnancy Category B and is considered safe during pregnancy. This medication is excreted in breast milk.

## 2022-07-27 ENCOUNTER — HOSPITAL ENCOUNTER (OUTPATIENT)
Dept: LAB | Facility: MEDICAL CENTER | Age: 37
End: 2022-07-27
Attending: STUDENT IN AN ORGANIZED HEALTH CARE EDUCATION/TRAINING PROGRAM
Payer: COMMERCIAL

## 2022-07-27 LAB
CANCER AG125 SERPL-ACNC: 46.2 U/ML (ref 0–35)
CEA SERPL-MCNC: 1.4 NG/ML (ref 0–3)

## 2022-07-27 PROCEDURE — 36415 COLL VENOUS BLD VENIPUNCTURE: CPT

## 2022-07-27 PROCEDURE — 86304 IMMUNOASSAY TUMOR CA 125: CPT

## 2022-07-27 PROCEDURE — 82378 CARCINOEMBRYONIC ANTIGEN: CPT

## 2022-07-29 ENCOUNTER — HOSPITAL ENCOUNTER (OUTPATIENT)
Dept: RADIOLOGY | Facility: MEDICAL CENTER | Age: 37
End: 2022-07-29
Attending: STUDENT IN AN ORGANIZED HEALTH CARE EDUCATION/TRAINING PROGRAM
Payer: COMMERCIAL

## 2022-07-29 DIAGNOSIS — R97.0 ELEVATED CARCINOEMBRYONIC ANTIGEN (CEA): ICD-10-CM

## 2022-07-29 DIAGNOSIS — D27.1 BENIGN NEOPLASM OF LEFT OVARY: ICD-10-CM

## 2022-07-29 DIAGNOSIS — R97.1 ELEVATED CANCER ANTIGEN 125 (CA 125): ICD-10-CM

## 2022-07-29 DIAGNOSIS — E66.01 MORBID (SEVERE) OBESITY DUE TO EXCESS CALORIES (HCC): ICD-10-CM

## 2022-07-29 DIAGNOSIS — R10.2 PELVIC AND PERINEAL PAIN: ICD-10-CM

## 2022-07-29 DIAGNOSIS — C56.2 MALIGNANT NEOPLASM OF LEFT OVARY (HCC): ICD-10-CM

## 2022-07-29 PROCEDURE — 700117 HCHG RX CONTRAST REV CODE 255: Performed by: STUDENT IN AN ORGANIZED HEALTH CARE EDUCATION/TRAINING PROGRAM

## 2022-07-29 PROCEDURE — 74177 CT ABD & PELVIS W/CONTRAST: CPT

## 2022-07-29 RX ORDER — HEPARIN SODIUM (PORCINE) LOCK FLUSH IV SOLN 100 UNIT/ML 100 UNIT/ML
300-500 SOLUTION INTRAVENOUS PRN
Status: DISCONTINUED | OUTPATIENT
Start: 2022-07-29 | End: 2022-07-29 | Stop reason: CLARIF

## 2022-07-29 RX ADMIN — IOHEXOL 25 ML: 240 INJECTION, SOLUTION INTRATHECAL; INTRAVASCULAR; INTRAVENOUS; ORAL at 11:43

## 2022-07-29 RX ADMIN — IOHEXOL 100 ML: 350 INJECTION, SOLUTION INTRAVENOUS at 11:20

## 2022-08-30 ENCOUNTER — HOSPITAL ENCOUNTER (OUTPATIENT)
Dept: LAB | Facility: MEDICAL CENTER | Age: 37
End: 2022-08-30
Attending: STUDENT IN AN ORGANIZED HEALTH CARE EDUCATION/TRAINING PROGRAM
Payer: COMMERCIAL

## 2022-08-30 LAB — CANCER AG125 SERPL-ACNC: 21.3 U/ML (ref 0–35)

## 2022-08-30 PROCEDURE — 86304 IMMUNOASSAY TUMOR CA 125: CPT

## 2022-08-30 PROCEDURE — 36415 COLL VENOUS BLD VENIPUNCTURE: CPT

## 2022-10-26 ENCOUNTER — HOSPITAL ENCOUNTER (OUTPATIENT)
Dept: LAB | Facility: MEDICAL CENTER | Age: 37
End: 2022-10-26
Attending: STUDENT IN AN ORGANIZED HEALTH CARE EDUCATION/TRAINING PROGRAM
Payer: COMMERCIAL

## 2022-10-26 LAB
CANCER AG125 SERPL-ACNC: 12.6 U/ML (ref 0–35)
CEA SERPL-MCNC: 0.7 NG/ML (ref 0–3)

## 2022-10-26 PROCEDURE — 82378 CARCINOEMBRYONIC ANTIGEN: CPT

## 2022-10-26 PROCEDURE — 86304 IMMUNOASSAY TUMOR CA 125: CPT

## 2022-10-26 PROCEDURE — 36415 COLL VENOUS BLD VENIPUNCTURE: CPT

## 2022-12-22 ENCOUNTER — APPOINTMENT (OUTPATIENT)
Dept: RADIOLOGY | Facility: MEDICAL CENTER | Age: 37
End: 2022-12-22
Attending: STUDENT IN AN ORGANIZED HEALTH CARE EDUCATION/TRAINING PROGRAM
Payer: COMMERCIAL

## 2023-01-05 ENCOUNTER — HOSPITAL ENCOUNTER (OUTPATIENT)
Dept: RADIOLOGY | Facility: MEDICAL CENTER | Age: 38
End: 2023-01-05
Attending: STUDENT IN AN ORGANIZED HEALTH CARE EDUCATION/TRAINING PROGRAM
Payer: COMMERCIAL

## 2023-01-05 DIAGNOSIS — R19.00 ABDOMINAL MASS, UNSPECIFIED ABDOMINAL LOCATION: ICD-10-CM

## 2023-01-05 DIAGNOSIS — R97.1 ELEVATED CANCER ANTIGEN 125 (CA 125): ICD-10-CM

## 2023-01-05 DIAGNOSIS — R97.0 ELEVATED CARCINOEMBRYONIC ANTIGEN (CEA): ICD-10-CM

## 2023-01-05 PROCEDURE — 700117 HCHG RX CONTRAST REV CODE 255: Performed by: STUDENT IN AN ORGANIZED HEALTH CARE EDUCATION/TRAINING PROGRAM

## 2023-01-05 PROCEDURE — 74177 CT ABD & PELVIS W/CONTRAST: CPT

## 2023-01-05 RX ADMIN — IOHEXOL 100 ML: 350 INJECTION, SOLUTION INTRAVENOUS at 17:45

## 2023-01-05 RX ADMIN — IOHEXOL 10 ML: 240 INJECTION, SOLUTION INTRATHECAL; INTRAVASCULAR; INTRAVENOUS; ORAL at 17:28

## 2023-01-05 RX ADMIN — IOHEXOL 10 ML: 240 INJECTION, SOLUTION INTRATHECAL; INTRAVASCULAR; INTRAVENOUS; ORAL at 17:26

## 2023-01-19 ENCOUNTER — APPOINTMENT (OUTPATIENT)
Dept: LAB | Facility: MEDICAL CENTER | Age: 38
End: 2023-01-19
Payer: COMMERCIAL

## 2023-01-21 ENCOUNTER — HOSPITAL ENCOUNTER (OUTPATIENT)
Dept: LAB | Facility: MEDICAL CENTER | Age: 38
End: 2023-01-21
Attending: STUDENT IN AN ORGANIZED HEALTH CARE EDUCATION/TRAINING PROGRAM
Payer: COMMERCIAL

## 2023-01-21 LAB
CANCER AG125 SERPL-ACNC: 12.1 U/ML (ref 0–35)
CEA SERPL-MCNC: 0.8 NG/ML (ref 0–3)

## 2023-01-21 PROCEDURE — 82378 CARCINOEMBRYONIC ANTIGEN: CPT

## 2023-01-21 PROCEDURE — 36415 COLL VENOUS BLD VENIPUNCTURE: CPT

## 2023-01-21 PROCEDURE — 86304 IMMUNOASSAY TUMOR CA 125: CPT

## 2023-04-26 ENCOUNTER — HOSPITAL ENCOUNTER (OUTPATIENT)
Dept: LAB | Facility: MEDICAL CENTER | Age: 38
End: 2023-04-26
Payer: COMMERCIAL

## 2023-04-26 LAB
CANCER AG125 SERPL-ACNC: 15.8 U/ML (ref 0–35)
CEA SERPL-MCNC: <0.6 NG/ML (ref 0–3)

## 2023-04-26 PROCEDURE — 36415 COLL VENOUS BLD VENIPUNCTURE: CPT

## 2023-04-26 PROCEDURE — 86304 IMMUNOASSAY TUMOR CA 125: CPT

## 2023-04-26 PROCEDURE — 82378 CARCINOEMBRYONIC ANTIGEN: CPT

## 2023-07-06 ENCOUNTER — HOSPITAL ENCOUNTER (OUTPATIENT)
Dept: RADIOLOGY | Facility: MEDICAL CENTER | Age: 38
End: 2023-07-06
Attending: PHYSICIAN ASSISTANT
Payer: COMMERCIAL

## 2023-07-06 DIAGNOSIS — E66.01 MORBID OBESITY (HCC): ICD-10-CM

## 2023-07-06 DIAGNOSIS — R97.1 ELEVATED CANCER ANTIGEN 125 (CA 125): ICD-10-CM

## 2023-07-06 DIAGNOSIS — R10.2 ADNEXAL TENDERNESS, RIGHT: ICD-10-CM

## 2023-07-06 DIAGNOSIS — C56.2 MALIGNANT NEOPLASM OF LEFT OVARY (HCC): ICD-10-CM

## 2023-07-06 DIAGNOSIS — R97.0 ELEVATED CARCINOEMBRYONIC ANTIGEN (CEA): ICD-10-CM

## 2023-07-06 DIAGNOSIS — R19.00 ABDOMINAL MASS, UNSPECIFIED ABDOMINAL LOCATION: ICD-10-CM

## 2023-07-06 PROCEDURE — 700117 HCHG RX CONTRAST REV CODE 255: Performed by: PHYSICIAN ASSISTANT

## 2023-07-06 PROCEDURE — 74177 CT ABD & PELVIS W/CONTRAST: CPT

## 2023-07-06 RX ADMIN — IOHEXOL 100 ML: 350 INJECTION, SOLUTION INTRAVENOUS at 17:09

## 2023-07-06 RX ADMIN — IOHEXOL 25 ML: 240 INJECTION, SOLUTION INTRATHECAL; INTRAVASCULAR; INTRAVENOUS; ORAL at 17:30

## 2023-07-27 ENCOUNTER — HOSPITAL ENCOUNTER (OUTPATIENT)
Dept: LAB | Facility: MEDICAL CENTER | Age: 38
End: 2023-07-27
Attending: PHYSICIAN ASSISTANT
Payer: COMMERCIAL

## 2023-07-27 LAB
CANCER AG125 SERPL-ACNC: 15.7 U/ML (ref 0–35)
CEA SERPL-MCNC: <0.6 NG/ML (ref 0–3)

## 2023-07-27 PROCEDURE — 86304 IMMUNOASSAY TUMOR CA 125: CPT

## 2023-07-27 PROCEDURE — 36415 COLL VENOUS BLD VENIPUNCTURE: CPT

## 2023-07-27 PROCEDURE — 82378 CARCINOEMBRYONIC ANTIGEN: CPT

## 2023-12-19 ENCOUNTER — HOSPITAL ENCOUNTER (OUTPATIENT)
Dept: RADIOLOGY | Facility: MEDICAL CENTER | Age: 38
End: 2023-12-19
Payer: COMMERCIAL

## 2023-12-19 DIAGNOSIS — E66.01 MORBID OBESITY (HCC): ICD-10-CM

## 2023-12-19 DIAGNOSIS — C56.2 MALIGNANT NEOPLASM OF LEFT OVARY (HCC): ICD-10-CM

## 2023-12-19 PROCEDURE — 700117 HCHG RX CONTRAST REV CODE 255

## 2023-12-19 PROCEDURE — 74177 CT ABD & PELVIS W/CONTRAST: CPT

## 2023-12-19 RX ADMIN — IOHEXOL 100 ML: 350 INJECTION, SOLUTION INTRAVENOUS at 16:45

## 2023-12-19 RX ADMIN — IOHEXOL 25 ML: 240 INJECTION, SOLUTION INTRATHECAL; INTRAVASCULAR; INTRAVENOUS; ORAL at 16:45

## 2024-01-30 ENCOUNTER — HOSPITAL ENCOUNTER (OUTPATIENT)
Dept: LAB | Facility: MEDICAL CENTER | Age: 39
End: 2024-01-30
Payer: COMMERCIAL

## 2024-01-30 PROCEDURE — 86304 IMMUNOASSAY TUMOR CA 125: CPT

## 2024-01-30 PROCEDURE — 82378 CARCINOEMBRYONIC ANTIGEN: CPT

## 2024-01-30 PROCEDURE — 36415 COLL VENOUS BLD VENIPUNCTURE: CPT

## 2024-01-31 LAB
CANCER AG125 SERPL-ACNC: 12 U/ML (ref 0–35)
CEA SERPL-MCNC: <0.6 NG/ML (ref 0–3)

## 2024-05-08 ENCOUNTER — HOSPITAL ENCOUNTER (OUTPATIENT)
Dept: LAB | Facility: MEDICAL CENTER | Age: 39
End: 2024-05-08
Attending: STUDENT IN AN ORGANIZED HEALTH CARE EDUCATION/TRAINING PROGRAM
Payer: COMMERCIAL

## 2024-05-09 LAB
CANCER AG125 SERPL-ACNC: 10.6 U/ML (ref 0–35)
CEA SERPL-MCNC: <0.6 NG/ML (ref 0–3)

## 2024-06-04 ENCOUNTER — HOSPITAL ENCOUNTER (OUTPATIENT)
Dept: LAB | Facility: MEDICAL CENTER | Age: 39
End: 2024-06-04
Attending: PHYSICIAN ASSISTANT
Payer: COMMERCIAL

## 2024-06-04 LAB
ANION GAP SERPL CALC-SCNC: 10 MMOL/L (ref 7–16)
BUN SERPL-MCNC: 17 MG/DL (ref 8–22)
CALCIUM SERPL-MCNC: 9.3 MG/DL (ref 8.5–10.5)
CANCER AG125 SERPL-ACNC: 11 U/ML (ref 0–35)
CEA SERPL-MCNC: <0.6 NG/ML (ref 0–3)
CHLORIDE SERPL-SCNC: 104 MMOL/L (ref 96–112)
CO2 SERPL-SCNC: 25 MMOL/L (ref 20–33)
CREAT SERPL-MCNC: 0.83 MG/DL (ref 0.5–1.4)
GFR SERPLBLD CREATININE-BSD FMLA CKD-EPI: 93 ML/MIN/1.73 M 2
GLUCOSE SERPL-MCNC: 82 MG/DL (ref 65–99)
POTASSIUM SERPL-SCNC: 3.8 MMOL/L (ref 3.6–5.5)
SODIUM SERPL-SCNC: 139 MMOL/L (ref 135–145)

## 2024-06-04 PROCEDURE — 86304 IMMUNOASSAY TUMOR CA 125: CPT

## 2024-06-04 PROCEDURE — 36415 COLL VENOUS BLD VENIPUNCTURE: CPT

## 2024-06-04 PROCEDURE — 80048 BASIC METABOLIC PNL TOTAL CA: CPT

## 2024-06-04 PROCEDURE — 82378 CARCINOEMBRYONIC ANTIGEN: CPT

## 2024-06-05 ENCOUNTER — HOSPITAL ENCOUNTER (OUTPATIENT)
Dept: RADIOLOGY | Facility: MEDICAL CENTER | Age: 39
End: 2024-06-05
Payer: COMMERCIAL

## 2024-06-05 DIAGNOSIS — E66.01 MORBID OBESITY (HCC): ICD-10-CM

## 2024-06-05 DIAGNOSIS — C56.2 MALIGNANT NEOPLASM OF LEFT OVARY (HCC): ICD-10-CM

## 2024-06-05 PROCEDURE — 74177 CT ABD & PELVIS W/CONTRAST: CPT

## 2024-06-05 PROCEDURE — 700117 HCHG RX CONTRAST REV CODE 255

## 2024-06-05 RX ADMIN — IOHEXOL 25 ML: 240 INJECTION, SOLUTION INTRATHECAL; INTRAVASCULAR; INTRAVENOUS; ORAL at 15:00

## 2024-06-05 RX ADMIN — IOHEXOL 100 ML: 350 INJECTION, SOLUTION INTRAVENOUS at 14:00

## 2024-09-24 ENCOUNTER — HOSPITAL ENCOUNTER (OUTPATIENT)
Dept: LAB | Facility: MEDICAL CENTER | Age: 39
End: 2024-09-24
Attending: PHYSICIAN ASSISTANT
Payer: COMMERCIAL

## 2024-09-24 LAB — CEA SERPL-MCNC: <0.6 NG/ML (ref 0–3)

## 2024-09-24 PROCEDURE — 36415 COLL VENOUS BLD VENIPUNCTURE: CPT

## 2024-09-24 PROCEDURE — 86304 IMMUNOASSAY TUMOR CA 125: CPT

## 2024-09-24 PROCEDURE — 82378 CARCINOEMBRYONIC ANTIGEN: CPT

## (undated) DEVICE — ARMREST CRADLE FOAM - (2PR/PK 12PR/CA)

## (undated) DEVICE — CANISTER SUCTION 3000ML MECHANICAL FILTER AUTO SHUTOFF MEDI-VAC NONSTERILE LF DISP  (40EA/CA)

## (undated) DEVICE — FILTER BLOOD TRANSFUSION - (40/CA) (PALL)

## (undated) DEVICE — TRAY CATHETER FOLEY URINE METER W/STATLOCK 350ML (10EA/CA)

## (undated) DEVICE — ELECTRODE DUAL RETURN W/ CORD - (50/PK)

## (undated) DEVICE — SUTURE 3-0 VICRYL PLUS SH - 8X 18 INCH (12/BX)

## (undated) DEVICE — PAD LAP STERILE 18 X 18 - (5/PK 40PK/CA)

## (undated) DEVICE — CANISTER SUCTION RIGID RED 1500CC (40EA/CA)

## (undated) DEVICE — GOWN SURGICAL X-LARGE ULTRA - FILM-REINFORCED (20/CA)

## (undated) DEVICE — SUCTION INSTRUMENT YANKAUER BULBOUS TIP W/O VENT (50EA/CA)

## (undated) DEVICE — TOWELS CLOTH SURGICAL - (4/PK 20PK/CA)

## (undated) DEVICE — GLOVE BIOGEL PI ORTHO SZ 7.5 PF LF (40PR/BX)

## (undated) DEVICE — MASK ANESTHESIA ADULT  - (100/CA)

## (undated) DEVICE — ELECTRODE 850 FOAM ADHESIVE - HYDROGEL RADIOTRNSPRNT (50/PK)

## (undated) DEVICE — GOWN SURGEONS X-LARGE - DISP. (30/CA)

## (undated) DEVICE — DRAPE STRLE REG TOWEL 18X24 - (10/BX 4BX/CA)"

## (undated) DEVICE — CHLORAPREP 26 ML APPLICATOR - ORANGE TINT(25/CA)

## (undated) DEVICE — PAD OR TABLE DA VINCI 2IN X 20IN X 72IN - (12EA/CA)

## (undated) DEVICE — KIT ANESTHESIA W/CIRCUIT & 3/LT BAG W/FILTER (20EA/CA)

## (undated) DEVICE — LEGGING LITHOTOMY 31 X 48 IN - (2EA/PK 20PK/CA)

## (undated) DEVICE — TUBING CLEARLINK DUO-VENT - C-FLO (48EA/CA)

## (undated) DEVICE — GLOVE COTTON STERILE (50/CA)

## (undated) DEVICE — GLOVE SZ 6 BIOGEL PI MICRO - PF LF (50PR/BX 4BX/CA)

## (undated) DEVICE — TRAY SKIN SCRUB PVP WET (20EA/CA) PART #DYND70356 DISCONTINUED

## (undated) DEVICE — SET BIFURCATED BLOOD - (48EA/CS)

## (undated) DEVICE — SET LEADWIRE 5 LEAD BEDSIDE DISPOSABLE ECG (1SET OF 5/EA)

## (undated) DEVICE — GOWN WARMING STANDARD FLEX - (30/CA)

## (undated) DEVICE — CORETEMP DRAPE FORM-FITTED EASY DROPANDGO DRAPE FOR USE ON THE CORETEMP FLUID MANAGEMENT 56IN X 56IN

## (undated) DEVICE — SUTURE GENERAL

## (undated) DEVICE — CLIP LG INTNL HRZN TI ESCP LGT - (20/BX)

## (undated) DEVICE — WATER IRRIGATION STERILE 1000ML (12EA/CA)

## (undated) DEVICE — TRANSDUCER ADULT DISP. SINGLE BONDED STERILE - (20EA/CA)

## (undated) DEVICE — STAPLER SKIN DISP - (6/BX 10BX/CA) VISISTAT

## (undated) DEVICE — SUTURE 3-0 MONOCRYL PLUS PS-1 - 27 INCH (36/BX)

## (undated) DEVICE — SUTURE 3-0 VICRYL PLUS SH - 27 INCH (36/BX)

## (undated) DEVICE — SPONGE GAUZESTER 4 X 4 4PLY - (128PK/CA)

## (undated) DEVICE — CLIP MED INTNL HRZN TI ESCP - (25/BX)

## (undated) DEVICE — NEPTUNE 4 PORT MANIFOLD - (20/PK)

## (undated) DEVICE — LACTATED RINGERS INJ 1000 ML - (14EA/CA 60CA/PF)

## (undated) DEVICE — BOVIE  BLADE 6 EXTENDED - (50/PK)

## (undated) DEVICE — GLOVE BIOGEL PI INDICATOR SZ 6.5 SURGICAL PF LF - (50/BX 4BX/CA)

## (undated) DEVICE — SENSOR OXIMETER ADULT SPO2 RD SET (20EA/BX)

## (undated) DEVICE — SUTURE 3-0 SILK SH C/R 18 IN - (12/BX)

## (undated) DEVICE — SUTURE 2-0 VICRYL PLUS CT-1 36 (36PK/BX)"

## (undated) DEVICE — SLEEVE, VASO, THIGH, MED

## (undated) DEVICE — DRAPE UNDER BUTTOCKS FLUID - (20/CA)

## (undated) DEVICE — TOWEL STOP TIMEOUT SAFETY FLAG (40EA/CA)

## (undated) DEVICE — DRAIN J-VAC 10MM FLAT - (10/CA)

## (undated) DEVICE — SET FLUID WARMING STANDARD FLOW - (10/CA)

## (undated) DEVICE — SUTURE 0 VICRYL PLUS CT-2 - 27 INCH (36/BX)

## (undated) DEVICE — COVER FOOT UNIVERSAL DISP. - (25EA/CA)

## (undated) DEVICE — PACK MAJOR BASIN - (2EA/CA)

## (undated) DEVICE — SET EXTENSION WITH 2 PORTS (48EA/CA) ***PART #2C8610 IS A SUBSTITUTE*****

## (undated) DEVICE — DRESSING NON-ADHERING 8 X 3 - (50/BX)

## (undated) DEVICE — LIGASURE TISSUE FUSION  - SINGLE USE (6/CA)

## (undated) DEVICE — SUTURE 0 COATED VICRYL 6-18IN - (12PK/BX)

## (undated) DEVICE — CLIP MED LG INTNL HRZN TI ESCP - (20/BX)

## (undated) DEVICE — SUTURE 2-0 VICRYL PLUS TP-1 - (24/BX)

## (undated) DEVICE — SLEEVE VASO CALF MED - (10PR/CA)

## (undated) DEVICE — GRAFT MESH SEPRAFILM PRO PACK - 5/BX CONTAINS 6 3X5 PIECES